# Patient Record
Sex: FEMALE | Race: WHITE | NOT HISPANIC OR LATINO | Employment: OTHER
[De-identification: names, ages, dates, MRNs, and addresses within clinical notes are randomized per-mention and may not be internally consistent; named-entity substitution may affect disease eponyms.]

---

## 2018-02-23 ENCOUNTER — RECORDS - HEALTHEAST (OUTPATIENT)
Dept: ADMINISTRATIVE | Facility: OTHER | Age: 23
End: 2018-02-23

## 2018-02-23 LAB — PAP SMEAR - HIM PATIENT REPORTED: NORMAL

## 2018-05-21 ENCOUNTER — OFFICE VISIT - HEALTHEAST (OUTPATIENT)
Dept: FAMILY MEDICINE | Facility: CLINIC | Age: 23
End: 2018-05-21

## 2018-05-21 DIAGNOSIS — Z00.00 HEALTH CARE MAINTENANCE: ICD-10-CM

## 2018-05-21 DIAGNOSIS — J45.909 ASTHMA: ICD-10-CM

## 2018-05-21 DIAGNOSIS — R10.2 PELVIC PAIN: ICD-10-CM

## 2018-05-21 LAB
ALBUMIN UR-MCNC: NEGATIVE MG/DL
APPEARANCE UR: CLEAR
BILIRUB UR QL STRIP: NEGATIVE
COLOR UR AUTO: YELLOW
GLUCOSE UR STRIP-MCNC: NEGATIVE MG/DL
HGB UR QL STRIP: NEGATIVE
KETONES UR STRIP-MCNC: NEGATIVE MG/DL
LEUKOCYTE ESTERASE UR QL STRIP: NEGATIVE
NITRATE UR QL: NEGATIVE
PH UR STRIP: 7 [PH] (ref 5–8)
SP GR UR STRIP: 1.01 (ref 1–1.03)
UROBILINOGEN UR STRIP-ACNC: NORMAL

## 2018-05-21 ASSESSMENT — MIFFLIN-ST. JEOR: SCORE: 1506.22

## 2018-05-23 LAB
C TRACH DNA SPEC QL PROBE+SIG AMP: NEGATIVE
N GONORRHOEA DNA SPEC QL NAA+PROBE: NEGATIVE

## 2018-05-29 ENCOUNTER — HOSPITAL ENCOUNTER (OUTPATIENT)
Dept: ULTRASOUND IMAGING | Facility: CLINIC | Age: 23
Discharge: HOME | End: 2018-05-29
Attending: NURSE PRACTITIONER

## 2018-05-29 DIAGNOSIS — R10.2 PELVIC PAIN: ICD-10-CM

## 2019-09-09 ENCOUNTER — COMMUNICATION - HEALTHEAST (OUTPATIENT)
Dept: SCHEDULING | Facility: CLINIC | Age: 24
End: 2019-09-09

## 2019-09-10 ENCOUNTER — RECORDS - HEALTHEAST (OUTPATIENT)
Dept: ADMINISTRATIVE | Facility: OTHER | Age: 24
End: 2019-09-10

## 2019-12-14 ENCOUNTER — RECORDS - HEALTHEAST (OUTPATIENT)
Dept: ADMINISTRATIVE | Facility: OTHER | Age: 24
End: 2019-12-14

## 2020-01-06 ENCOUNTER — RECORDS - HEALTHEAST (OUTPATIENT)
Dept: ADMINISTRATIVE | Facility: OTHER | Age: 25
End: 2020-01-06

## 2020-01-08 ENCOUNTER — COMMUNICATION - HEALTHEAST (OUTPATIENT)
Dept: FAMILY MEDICINE | Facility: CLINIC | Age: 25
End: 2020-01-08

## 2020-01-09 ENCOUNTER — COMMUNICATION - HEALTHEAST (OUTPATIENT)
Dept: SCHEDULING | Facility: CLINIC | Age: 25
End: 2020-01-09

## 2020-01-09 ENCOUNTER — RECORDS - HEALTHEAST (OUTPATIENT)
Dept: ADMINISTRATIVE | Facility: OTHER | Age: 25
End: 2020-01-09

## 2020-01-21 ENCOUNTER — RECORDS - HEALTHEAST (OUTPATIENT)
Dept: ADMINISTRATIVE | Facility: OTHER | Age: 25
End: 2020-01-21

## 2020-01-23 ENCOUNTER — OFFICE VISIT - HEALTHEAST (OUTPATIENT)
Dept: FAMILY MEDICINE | Facility: CLINIC | Age: 25
End: 2020-01-23

## 2020-01-23 DIAGNOSIS — Z00.00 ROUTINE GENERAL MEDICAL EXAMINATION AT A HEALTH CARE FACILITY: ICD-10-CM

## 2020-01-23 DIAGNOSIS — J45.909 ASTHMA: ICD-10-CM

## 2020-01-23 DIAGNOSIS — A38.9 SCARLET FEVER: ICD-10-CM

## 2020-01-23 DIAGNOSIS — J45.20 MILD INTERMITTENT ASTHMA WITHOUT COMPLICATION: ICD-10-CM

## 2020-01-23 RX ORDER — ALBUTEROL SULFATE 90 UG/1
2 AEROSOL, METERED RESPIRATORY (INHALATION) EVERY 4 HOURS PRN
Qty: 1 INHALER | Refills: 1 | Status: SHIPPED | OUTPATIENT
Start: 2020-01-23 | End: 2022-10-27

## 2020-01-23 ASSESSMENT — MIFFLIN-ST. JEOR: SCORE: 1437.95

## 2020-02-03 ENCOUNTER — COMMUNICATION - HEALTHEAST (OUTPATIENT)
Dept: SCHEDULING | Facility: CLINIC | Age: 25
End: 2020-02-03

## 2020-02-03 ENCOUNTER — RECORDS - HEALTHEAST (OUTPATIENT)
Dept: ADMINISTRATIVE | Facility: OTHER | Age: 25
End: 2020-02-03

## 2020-02-07 ENCOUNTER — OFFICE VISIT - HEALTHEAST (OUTPATIENT)
Dept: FAMILY MEDICINE | Facility: CLINIC | Age: 25
End: 2020-02-07

## 2020-02-07 DIAGNOSIS — Z86.73 HISTORY OF TIA (TRANSIENT ISCHEMIC ATTACK) AND STROKE: ICD-10-CM

## 2020-02-07 DIAGNOSIS — R07.89 ATYPICAL CHEST PAIN: ICD-10-CM

## 2020-02-07 DIAGNOSIS — R21 RASH: ICD-10-CM

## 2020-02-07 DIAGNOSIS — M25.562 ARTHRALGIA OF BOTH KNEES: ICD-10-CM

## 2020-02-07 DIAGNOSIS — M25.561 ARTHRALGIA OF BOTH KNEES: ICD-10-CM

## 2020-02-07 DIAGNOSIS — R53.83 FATIGUE, UNSPECIFIED TYPE: ICD-10-CM

## 2020-02-07 DIAGNOSIS — Z86.19 HISTORY OF SCARLET FEVER: ICD-10-CM

## 2020-02-07 LAB — RHEUMATOID FACT SERPL-ACNC: <15 IU/ML (ref 0–30)

## 2020-02-08 LAB — EBV VCA IGM SER IA-ACNC: <0.2 AI (ref 0–0.8)

## 2020-02-10 ENCOUNTER — COMMUNICATION - HEALTHEAST (OUTPATIENT)
Dept: FAMILY MEDICINE | Facility: CLINIC | Age: 25
End: 2020-02-10

## 2020-02-10 ENCOUNTER — HOSPITAL ENCOUNTER (OUTPATIENT)
Dept: CARDIOLOGY | Facility: CLINIC | Age: 25
Discharge: HOME | End: 2020-02-10
Attending: NURSE PRACTITIONER

## 2020-02-10 DIAGNOSIS — R07.89 ATYPICAL CHEST PAIN: ICD-10-CM

## 2020-02-10 LAB
ANA SER QL: 0.9 U
AORTIC ROOT: 2.7 CM
AORTIC VALVE MEAN VELOCITY: 84.8 CM/S
ASCENDING AORTA: 2.8 CM
AV DIMENSIONLESS INDEX VTI: 0.8
AV MEAN GRADIENT: 3 MMHG
AV PEAK GRADIENT: 5.1 MMHG
AV VALVE AREA: 2.4 CM2
AV VELOCITY RATIO: 0.9
BSA FOR ECHO PROCEDURE: 1.77 M2
CV BLOOD PRESSURE: NORMAL MMHG
CV ECHO HEIGHT: 64.3 IN
CV ECHO WEIGHT: 153 LBS
DOP CALC AO PEAK VEL: 113 CM/S
DOP CALC AO VTI: 24.2 CM
DOP CALC LVOT AREA: 2.83 CM2
DOP CALC LVOT DIAMETER: 1.9 CM
DOP CALC LVOT PEAK VEL: 96.8 CM/S
DOP CALC LVOT STROKE VOLUME: 57.2 CM3
DOP CALC MV VTI: 32.1 CM
DOP CALCLVOT PEAK VEL VTI: 20.2 CM
EJECTION FRACTION: 61 % (ref 55–75)
FRACTIONAL SHORTENING: 30.4 % (ref 28–44)
INTERVENTRICULAR SEPTUM IN END DIASTOLE: 0.8 CM (ref 0.6–0.9)
IVS/PW RATIO: 1
LA AREA 1: 19 CM2
LA AREA 2: 13.6 CM2
LEFT ATRIUM LENGTH: 4.28 CM
LEFT ATRIUM SIZE: 3.1 CM
LEFT ATRIUM VOLUME INDEX: 29 ML/M2
LEFT ATRIUM VOLUME: 51.3 ML
LEFT VENTRICLE CARDIAC INDEX: 2.1 L/MIN/M2
LEFT VENTRICLE CARDIAC OUTPUT: 3.7 L/MIN
LEFT VENTRICLE DIASTOLIC VOLUME INDEX: 42.4 CM3/M2 (ref 29–61)
LEFT VENTRICLE DIASTOLIC VOLUME: 75 CM3 (ref 46–106)
LEFT VENTRICLE HEART RATE: 65 BPM
LEFT VENTRICLE MASS INDEX: 66.6 G/M2
LEFT VENTRICLE SYSTOLIC VOLUME INDEX: 16.4 CM3/M2 (ref 8–24)
LEFT VENTRICLE SYSTOLIC VOLUME: 29 CM3 (ref 14–42)
LEFT VENTRICULAR INTERNAL DIMENSION IN DIASTOLE: 4.6 CM (ref 3.8–5.2)
LEFT VENTRICULAR INTERNAL DIMENSION IN SYSTOLE: 3.2 CM (ref 2.2–3.5)
LEFT VENTRICULAR MASS: 117.9 G
LEFT VENTRICULAR OUTFLOW TRACT MEAN GRADIENT: 2 MMHG
LEFT VENTRICULAR OUTFLOW TRACT MEAN VELOCITY: 71 CM/S
LEFT VENTRICULAR OUTFLOW TRACT PEAK GRADIENT: 4 MMHG
LEFT VENTRICULAR POSTERIOR WALL IN END DIASTOLE: 0.8 CM (ref 0.6–0.9)
LV STROKE VOLUME INDEX: 32.3 ML/M2
MITRAL VALVE DECELERATION SLOPE: 3400 MM/S2
MITRAL VALVE MEAN INFLOW VELOCITY: 54.4 CM/S
MITRAL VALVE PEAK VELOCITY: 114 CM/S
MITRAL VALVE PRESSURE HALF-TIME: 100 MS
MV AREA VTI: 1.78 CM2
MV AVERAGE E/E' RATIO: 5.2 CM/S
MV DECELERATION TIME: 282 MS
MV E'TISSUE VEL-LAT: 21.1 CM/S
MV E'TISSUE VEL-MED: 13.6 CM/S
MV LATERAL E/E' RATIO: 4.3
MV MEAN GRADIENT: 2 MMHG
MV MEDIAL E/E' RATIO: 6.7
MV PEAK E VELOCITY: 90.7 CM/S
MV PEAK GRADIENT: 5.2 MMHG
MV VALVE AREA BY CONTINUITY EQUATION: 1.8 CM2
MV VALVE AREA PRESSURE 1/2 METHOD: 2.2 CM2
NUC REST DIASTOLIC VOLUME INDEX: 2448 LBS
NUC REST SYSTOLIC VOLUME INDEX: 64.25 IN
TRICUSPID VALVE ANULAR PLANE SYSTOLIC EXCURSION: 1.7 CM

## 2020-02-10 ASSESSMENT — MIFFLIN-ST. JEOR: SCORE: 1427.97

## 2020-02-13 ENCOUNTER — OFFICE VISIT - HEALTHEAST (OUTPATIENT)
Dept: FAMILY MEDICINE | Facility: CLINIC | Age: 25
End: 2020-02-13

## 2020-02-13 ENCOUNTER — AMBULATORY - HEALTHEAST (OUTPATIENT)
Dept: LAB | Facility: CLINIC | Age: 25
End: 2020-02-13

## 2020-02-13 DIAGNOSIS — R63.4 WEIGHT LOSS: ICD-10-CM

## 2020-02-13 DIAGNOSIS — R19.7 DIARRHEA, UNSPECIFIED TYPE: ICD-10-CM

## 2020-02-13 DIAGNOSIS — Z86.19 HISTORY OF SCARLET FEVER: ICD-10-CM

## 2020-02-13 DIAGNOSIS — R59.1 LYMPHADENOPATHY: ICD-10-CM

## 2020-02-13 DIAGNOSIS — R10.84 ABDOMINAL PAIN, GENERALIZED: ICD-10-CM

## 2020-02-13 LAB
ALBUMIN SERPL-MCNC: 4.2 G/DL (ref 3.5–5)
ALBUMIN UR-MCNC: ABNORMAL MG/DL
ALP SERPL-CCNC: 49 U/L (ref 45–120)
ALT SERPL W P-5'-P-CCNC: 12 U/L (ref 0–45)
ANION GAP SERPL CALCULATED.3IONS-SCNC: 6 MMOL/L (ref 5–18)
APPEARANCE UR: CLEAR
AST SERPL W P-5'-P-CCNC: 15 U/L (ref 0–40)
BACTERIA #/AREA URNS HPF: ABNORMAL HPF
BILIRUB SERPL-MCNC: 0.6 MG/DL (ref 0–1)
BILIRUB UR QL STRIP: NEGATIVE
BUN SERPL-MCNC: 11 MG/DL (ref 8–22)
C DIFF TOX B STL QL: NEGATIVE
CALCIUM SERPL-MCNC: 9.2 MG/DL (ref 8.5–10.5)
CHLORIDE BLD-SCNC: 106 MMOL/L (ref 98–107)
CO2 SERPL-SCNC: 27 MMOL/L (ref 22–31)
COLOR UR AUTO: YELLOW
CREAT SERPL-MCNC: 0.9 MG/DL (ref 0.6–1.1)
GFR SERPL CREATININE-BSD FRML MDRD: >60 ML/MIN/1.73M2
GLUCOSE BLD-MCNC: 73 MG/DL (ref 70–125)
GLUCOSE UR STRIP-MCNC: NEGATIVE MG/DL
HBA1C MFR BLD: 5 % (ref 3.5–6)
HGB UR QL STRIP: NEGATIVE
KETONES UR STRIP-MCNC: NEGATIVE MG/DL
LEUKOCYTE ESTERASE UR QL STRIP: NEGATIVE
LIPASE SERPL-CCNC: 29 U/L (ref 0–52)
MUCOUS THREADS #/AREA URNS LPF: ABNORMAL LPF
NITRATE UR QL: NEGATIVE
PH UR STRIP: 5.5 [PH] (ref 4.5–8)
POTASSIUM BLD-SCNC: 4.5 MMOL/L (ref 3.5–5)
PROT SERPL-MCNC: 7 G/DL (ref 6–8)
RBC #/AREA URNS AUTO: ABNORMAL HPF
RIBOTYPE 027/NAP1/BI: NORMAL
SODIUM SERPL-SCNC: 139 MMOL/L (ref 136–145)
SP GR UR STRIP: 1.02 (ref 1–1.03)
SQUAMOUS #/AREA URNS AUTO: ABNORMAL LPF
T4 FREE SERPL-MCNC: 1 NG/DL (ref 0.7–1.8)
TSH SERPL DL<=0.005 MIU/L-ACNC: 0.99 UIU/ML (ref 0.3–5)
UROBILINOGEN UR STRIP-ACNC: ABNORMAL
WBC #/AREA URNS AUTO: ABNORMAL HPF

## 2020-02-14 ENCOUNTER — COMMUNICATION - HEALTHEAST (OUTPATIENT)
Dept: FAMILY MEDICINE | Facility: CLINIC | Age: 25
End: 2020-02-14

## 2020-02-14 LAB
BASOPHILS # BLD AUTO: 0.1 THOU/UL (ref 0–0.2)
BASOPHILS NFR BLD AUTO: 1 % (ref 0–2)
EOSINOPHIL # BLD AUTO: 0.2 THOU/UL (ref 0–0.4)
EOSINOPHIL NFR BLD AUTO: 3 % (ref 0–6)
ERYTHROCYTE [DISTWIDTH] IN BLOOD BY AUTOMATED COUNT: 13.3 % (ref 11–14.5)
HCT VFR BLD AUTO: 37.3 % (ref 35–47)
HGB BLD-MCNC: 12.3 G/DL (ref 12–16)
LAB AP CHARGES (HE HISTORICAL CONVERSION): NORMAL
LYMPHOCYTES # BLD AUTO: 2.4 THOU/UL (ref 0.8–4.4)
LYMPHOCYTES NFR BLD AUTO: 39 % (ref 20–40)
MCH RBC QN AUTO: 28.5 PG (ref 27–34)
MCHC RBC AUTO-ENTMCNC: 33 G/DL (ref 32–36)
MCV RBC AUTO: 87 FL (ref 80–100)
MONOCYTES # BLD AUTO: 0.5 THOU/UL (ref 0–0.9)
MONOCYTES NFR BLD AUTO: 8 % (ref 2–10)
NEUTROPHILS # BLD AUTO: 3 THOU/UL (ref 2–7.7)
NEUTROPHILS NFR BLD AUTO: 49 % (ref 50–70)
PATH REPORT.COMMENTS IMP SPEC: NORMAL
PATH REPORT.COMMENTS IMP SPEC: NORMAL
PATH REPORT.FINAL DX SPEC: NORMAL
PATH REPORT.MICROSCOPIC SPEC OTHER STN: ABNORMAL
PATH REPORT.RELEVANT HX SPEC: NORMAL
PLAT MORPH BLD: NORMAL
PLATELET # BLD AUTO: 195 THOU/UL (ref 140–440)
PMV BLD AUTO: 13.1 FL (ref 8.5–12.5)
RBC # BLD AUTO: 4.31 MILL/UL (ref 3.8–5.4)
REACTIVE LYMPHS: ABNORMAL
WBC: 6.1 THOU/UL (ref 4–11)

## 2020-05-11 ENCOUNTER — COMMUNICATION - HEALTHEAST (OUTPATIENT)
Dept: FAMILY MEDICINE | Facility: CLINIC | Age: 25
End: 2020-05-11

## 2020-05-13 ENCOUNTER — COMMUNICATION - HEALTHEAST (OUTPATIENT)
Dept: SCHEDULING | Facility: CLINIC | Age: 25
End: 2020-05-13

## 2020-05-14 ENCOUNTER — OFFICE VISIT - HEALTHEAST (OUTPATIENT)
Dept: FAMILY MEDICINE | Facility: CLINIC | Age: 25
End: 2020-05-14

## 2020-05-14 DIAGNOSIS — H93.13 TINNITUS, BILATERAL: ICD-10-CM

## 2020-05-14 DIAGNOSIS — R59.1 LYMPHADENOPATHY: ICD-10-CM

## 2020-05-14 DIAGNOSIS — N92.6 IRREGULAR MENSES: ICD-10-CM

## 2020-05-14 DIAGNOSIS — M25.50 POLYARTHRALGIA: ICD-10-CM

## 2020-05-14 DIAGNOSIS — R00.2 PALPITATIONS: ICD-10-CM

## 2020-05-14 DIAGNOSIS — R80.9 PROTEINURIA, UNSPECIFIED TYPE: ICD-10-CM

## 2020-05-19 ENCOUNTER — HOSPITAL ENCOUNTER (OUTPATIENT)
Dept: ULTRASOUND IMAGING | Facility: CLINIC | Age: 25
Discharge: HOME | End: 2020-05-19
Attending: NURSE PRACTITIONER

## 2020-05-19 DIAGNOSIS — R59.1 LYMPHADENOPATHY: ICD-10-CM

## 2020-05-20 ENCOUNTER — AMBULATORY - HEALTHEAST (OUTPATIENT)
Dept: LAB | Facility: CLINIC | Age: 25
End: 2020-05-20

## 2020-05-20 DIAGNOSIS — M25.50 POLYARTHRALGIA: ICD-10-CM

## 2020-05-20 DIAGNOSIS — H93.13 TINNITUS, BILATERAL: ICD-10-CM

## 2020-05-20 DIAGNOSIS — R59.1 LYMPHADENOPATHY: ICD-10-CM

## 2020-05-20 DIAGNOSIS — R00.2 PALPITATIONS: ICD-10-CM

## 2020-05-20 DIAGNOSIS — N92.6 IRREGULAR MENSES: ICD-10-CM

## 2020-05-20 DIAGNOSIS — R80.9 PROTEINURIA, UNSPECIFIED TYPE: ICD-10-CM

## 2020-05-20 LAB
ALBUMIN UR-MCNC: NEGATIVE MG/DL
ANION GAP SERPL CALCULATED.3IONS-SCNC: 7 MMOL/L (ref 5–18)
APPEARANCE UR: CLEAR
BILIRUB UR QL STRIP: NEGATIVE
BUN SERPL-MCNC: 10 MG/DL (ref 8–22)
CALCIUM SERPL-MCNC: 8.7 MG/DL (ref 8.5–10.5)
CHLORIDE BLD-SCNC: 105 MMOL/L (ref 98–107)
CO2 SERPL-SCNC: 27 MMOL/L (ref 22–31)
COLOR UR AUTO: YELLOW
CREAT SERPL-MCNC: 0.78 MG/DL (ref 0.6–1.1)
ERYTHROCYTE [DISTWIDTH] IN BLOOD BY AUTOMATED COUNT: 13.5 % (ref 11–14.5)
GFR SERPL CREATININE-BSD FRML MDRD: >60 ML/MIN/1.73M2
GLUCOSE BLD-MCNC: 83 MG/DL (ref 70–125)
GLUCOSE UR STRIP-MCNC: NEGATIVE MG/DL
HCT VFR BLD AUTO: 38.6 % (ref 35–47)
HGB BLD-MCNC: 13.5 G/DL (ref 12–16)
HGB UR QL STRIP: NEGATIVE
KETONES UR STRIP-MCNC: NEGATIVE MG/DL
LEUKOCYTE ESTERASE UR QL STRIP: NEGATIVE
MCH RBC QN AUTO: 29.6 PG (ref 27–34)
MCHC RBC AUTO-ENTMCNC: 35 G/DL (ref 32–36)
MCV RBC AUTO: 85 FL (ref 80–100)
NITRATE UR QL: NEGATIVE
PH UR STRIP: 7 [PH] (ref 5–8)
PLATELET # BLD AUTO: 150 THOU/UL (ref 140–440)
PMV BLD AUTO: 9.2 FL (ref 7–10)
POTASSIUM BLD-SCNC: 4.5 MMOL/L (ref 3.5–5)
PROLACTIN SERPL-MCNC: 19.4 NG/ML (ref 0–20)
RBC # BLD AUTO: 4.55 MILL/UL (ref 3.8–5.4)
SODIUM SERPL-SCNC: 139 MMOL/L (ref 136–145)
SP GR UR STRIP: 1.02 (ref 1–1.03)
TSH SERPL DL<=0.005 MIU/L-ACNC: 0.97 UIU/ML (ref 0.3–5)
UROBILINOGEN UR STRIP-ACNC: NORMAL
WBC: 5 THOU/UL (ref 4–11)

## 2020-05-22 ENCOUNTER — COMMUNICATION - HEALTHEAST (OUTPATIENT)
Dept: FAMILY MEDICINE | Facility: CLINIC | Age: 25
End: 2020-05-22

## 2020-05-22 LAB — B BURGDOR IGG+IGM SER QL: 0.06 INDEX VALUE

## 2020-08-25 ENCOUNTER — RECORDS - HEALTHEAST (OUTPATIENT)
Dept: HEALTH INFORMATION MANAGEMENT | Facility: CLINIC | Age: 25
End: 2020-08-25

## 2020-12-21 ENCOUNTER — COMMUNICATION - HEALTHEAST (OUTPATIENT)
Dept: FAMILY MEDICINE | Facility: CLINIC | Age: 25
End: 2020-12-21

## 2020-12-29 ENCOUNTER — OFFICE VISIT - HEALTHEAST (OUTPATIENT)
Dept: FAMILY MEDICINE | Facility: CLINIC | Age: 25
End: 2020-12-29

## 2020-12-29 DIAGNOSIS — I73.00 RAYNAUD'S DISEASE WITHOUT GANGRENE: ICD-10-CM

## 2020-12-29 DIAGNOSIS — Z86.73 HISTORY OF TIA (TRANSIENT ISCHEMIC ATTACK) AND STROKE: ICD-10-CM

## 2020-12-29 DIAGNOSIS — T14.8XXA BRUISING: ICD-10-CM

## 2020-12-29 DIAGNOSIS — M25.50 POLYARTHRALGIA: ICD-10-CM

## 2020-12-29 DIAGNOSIS — R10.2 PELVIC PAIN: ICD-10-CM

## 2020-12-29 LAB
APTT PPP: 34 SECONDS (ref 24–37)
C3 SERPL-MCNC: 118 MG/DL (ref 83–177)
C4 SERPL-MCNC: 25 MG/DL (ref 19–59)
ERYTHROCYTE [DISTWIDTH] IN BLOOD BY AUTOMATED COUNT: 12.2 % (ref 11–14.5)
HCT VFR BLD AUTO: 42.1 % (ref 35–47)
HGB BLD-MCNC: 14 G/DL (ref 12–16)
INR PPP: 0.97 (ref 0.9–1.1)
MCH RBC QN AUTO: 29.2 PG (ref 27–34)
MCHC RBC AUTO-ENTMCNC: 33.4 G/DL (ref 32–36)
MCV RBC AUTO: 87 FL (ref 80–100)
PLATELET # BLD AUTO: 201 THOU/UL (ref 140–440)
PMV BLD AUTO: 9.9 FL (ref 7–10)
RBC # BLD AUTO: 4.81 MILL/UL (ref 3.8–5.4)
WBC: 5.4 THOU/UL (ref 4–11)

## 2021-01-05 LAB
GLIADIN IGA SER-ACNC: 1.9 U/ML
GLIADIN IGG SER-ACNC: <0.4 U/ML
IGA SERPL-MCNC: 169 MG/DL (ref 65–400)
TTG IGA SER-ACNC: 0.4 U/ML
TTG IGG SER-ACNC: 2.5 U/ML

## 2021-01-20 ENCOUNTER — OFFICE VISIT - HEALTHEAST (OUTPATIENT)
Dept: RHEUMATOLOGY | Facility: CLINIC | Age: 26
End: 2021-01-20

## 2021-01-20 DIAGNOSIS — H93.13 TINNITUS OF BOTH EARS: ICD-10-CM

## 2021-01-20 DIAGNOSIS — M79.18 BUTTOCK PAIN: ICD-10-CM

## 2021-01-20 DIAGNOSIS — G89.29 CHRONIC PAIN OF MULTIPLE JOINTS: ICD-10-CM

## 2021-01-20 DIAGNOSIS — I73.00 RAYNAUD'S DISEASE WITHOUT GANGRENE: ICD-10-CM

## 2021-01-20 DIAGNOSIS — M25.50 CHRONIC PAIN OF MULTIPLE JOINTS: ICD-10-CM

## 2021-01-25 ENCOUNTER — COMMUNICATION - HEALTHEAST (OUTPATIENT)
Dept: RHEUMATOLOGY | Facility: CLINIC | Age: 26
End: 2021-01-25

## 2021-05-28 ASSESSMENT — ASTHMA QUESTIONNAIRES
ACT_TOTALSCORE: 23
ACT_TOTALSCORE: 22

## 2021-05-31 ENCOUNTER — RECORDS - HEALTHEAST (OUTPATIENT)
Dept: ADMINISTRATIVE | Facility: CLINIC | Age: 26
End: 2021-05-31

## 2021-06-01 VITALS — WEIGHT: 168.5 LBS | HEIGHT: 65 IN | BODY MASS INDEX: 28.07 KG/M2
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
## 2021-06-04 VITALS
OXYGEN SATURATION: 98 % | DIASTOLIC BLOOD PRESSURE: 52 MMHG | SYSTOLIC BLOOD PRESSURE: 82 MMHG | BODY MASS INDEX: 26.5 KG/M2 | HEIGHT: 64 IN | HEART RATE: 68 BPM | WEIGHT: 155.2 LBS

## 2021-06-04 VITALS
WEIGHT: 153.3 LBS | TEMPERATURE: 97.8 F | BODY MASS INDEX: 26.11 KG/M2 | DIASTOLIC BLOOD PRESSURE: 60 MMHG | OXYGEN SATURATION: 100 % | HEART RATE: 86 BPM | SYSTOLIC BLOOD PRESSURE: 98 MMHG

## 2021-06-04 VITALS
BODY MASS INDEX: 26.11 KG/M2 | WEIGHT: 153.3 LBS | SYSTOLIC BLOOD PRESSURE: 90 MMHG | OXYGEN SATURATION: 100 % | DIASTOLIC BLOOD PRESSURE: 58 MMHG | HEART RATE: 76 BPM

## 2021-06-04 VITALS — HEIGHT: 64 IN | WEIGHT: 153 LBS | BODY MASS INDEX: 26.12 KG/M2

## 2021-06-05 VITALS
DIASTOLIC BLOOD PRESSURE: 62 MMHG | OXYGEN SATURATION: 99 % | WEIGHT: 158.5 LBS | HEART RATE: 65 BPM | BODY MASS INDEX: 27 KG/M2 | SYSTOLIC BLOOD PRESSURE: 96 MMHG

## 2021-06-05 NOTE — TELEPHONE ENCOUNTER
"\"I am have some strep throat, scarlet fever complications.\" Patient completed Clindamycin last week. Reporting new onset of shortness of breath today. Chest pain with deep breath. Afebrile. Ongoing mild upper abdomen pain and tenderness.   Per guidelines advised Emergency Room. Patient stating she will go back to [facility] ER now and have fiance drive her.      Bell Josue RN  [facility]      Reason for Disposition    Difficulty breathing (per caller) but not severe    Protocols used: STREP THROAT INFECTION ON ANTIBIOTIC FOLLOW-UP CALL-A-AH      "

## 2021-06-05 NOTE — TELEPHONE ENCOUNTER
Symptom  Describe your symptoms: Sore throat , Cough out green mucous ,Temprature 100-101   Any pain: yes  New/Ongoing: Ongoing  How long have you been having symptoms: 4  day(s)  Have you been seen for this:  Yes Patient went Urgentcare on 01/06/2020  Appointment offered?: Patient declines  Triage offered?: Yes, declined  Home remedies tried: Patient is on Antibiotics .  Requested Pharmacy: [facility]  # [id number]  Okay to leave a detailed message? No  Patient states she went to Urgent care on 01/06/2020 they prescribed ZITHROMAX but its not helping her symptoms she is still coughing out green mucus fever T 100-101 when she is take Tylenol her tempeture is coming down . Patient states this coming Saturday is her wedding she is not felling good requesting provider recommendations. Please advise.

## 2021-06-05 NOTE — TELEPHONE ENCOUNTER
Rash on the neck only    Currently taking a z-toyin    It is itchy    It breathing ok    No nausea    No vomiting    Did have a strep rash on the neck when this all started.    Advised that she be seen.    Nisha Melton RN   [facility] Medication Refill and Triage Nurse  11:56 AM  1/9/2020      Reason for Disposition    Patient wants to be seen    Protocols used: RASH OR REDNESS - [id number]

## 2021-06-05 NOTE — PROGRESS NOTES
Assessment and Plan:    1. Routine general medical examination at a health care facility  Discussed consuming a healthy diet and exercising.  She declines influenza vaccine.  She is not interested in HIV or STD screening.    2. Scarlet fever  She continues clindamycin.  Symptoms are improving.    3. Asthma  Obtained ACT score of 22.  She continues albuterol as needed.  She is to follow-up if symptoms persist or worsen.  - albuterol (PROAIR HFA;PROVENTIL HFA;VENTOLIN HFA) 90 mcg/actuation inhaler; Inhale 2 puffs every 4 (four) hours as needed.  Dispense: 1 Inhaler; Refill: 1      Subjective:     Otf is a 24 y.o. female presenting to the clinic for a female physical.     LMP: 1/20/20 regular once/month   Hx of abnormal pap smear: none   Last pap smear: last year at [facility]   Perform self-breast exams: yes   Vaginal discharge or irritation: none   Sexually active:  January 11th   Contraception: none   Concerns for STDs: none   Previous pregnancies:none     She presents for ER follow-up. She has intermittent asthma which flares during the spring and summer.  She used her albuterol inhaler twice over the past week due to recent strep pharyngitis.  Patient was diagnosed on 1/6/2020 with scarlet fever.  She was treated with a Z-Aravind.  Patient's presented to the ER on 1/9/2020 because her rash was worsening.  She was started on clindamycin.  She was then seen in the emergency room on 1/01/19 due to dizziness.  This was suspected to be due to dehydration.  Patient has increased her fluid intake.  Patient states her sore throat has improved.  She has a small rash within her left upper abdomen, otherwise the rash has resolved.    Review of systems:  I performed a 10 point review of systems.  All pertinent positives and negatives are noted in the HPI. All others are negative.     Allergies   Allergen Reactions     Amoxicillin Rash     Sulfa (Sulfonamide Antibiotics) Hives and Rash      Sulfamethoxazole-Trimethoprim Rash       Current Outpatient Medications on File Prior to Visit   Medication Sig Dispense Refill     albuterol (PROAIR HFA;PROVENTIL HFA;VENTOLIN HFA) 90 mcg/actuation inhaler Inhale 2 puffs every 4 (four) hours as needed. 1 Inhaler 1     clindamycin (CLEOCIN) 300 MG capsule        cyanocobalamin (VITAMIN B-12) 1000 MCG tablet Take 1,000 mcg by mouth daily.       folic acid (FOLVITE) 1 MG tablet Take 1 mg by mouth daily.       aspirin 81 mg chewable tablet Chew 81 mg daily.       No current facility-administered medications on file prior to visit.        Social History     Socioeconomic History     Marital status: Single     Spouse name: Not on file     Number of children: 0     Years of education: Not on file     Highest education level: Not on file   Occupational History     Occupation:    Social Needs     Financial resource strain: Not on file     Food insecurity:     Worry: Not on file     Inability: Not on file     Transportation needs:     Medical: Not on file     Non-medical: Not on file   Tobacco Use     Smoking status: Never Smoker     Smokeless tobacco: Never Used   Substance and Sexual Activity     Alcohol use: Yes     Alcohol/week: 5.0 standard drinks     Types: 5 Cans of beer per week     Drug use: No     Sexual activity: Never   Lifestyle     Physical activity:     Days per week: Not on file     Minutes per session: Not on file     Stress: Not on file   Relationships     Social connections:     Talks on phone: Not on file     Gets together: Not on file     Attends Religion service: Not on file     Active member of club or organization: Not on file     Attends meetings of clubs or organizations: Not on file     Relationship status: Not on file     Intimate partner violence:     Fear of current or ex partner: Not on file     Emotionally abused: Not on file     Physically abused: Not on file     Forced sexual activity: Not on file   Other Topics Concern     Not  on file   Social History Narrative     Not on file       Past Medical History:   Diagnosis Date     Asthma      Clotting disorder (H)      Peptic ulceration        Family History   Problem Relation Age of Onset     Cancer Paternal Grandfather 74        mesothelioma       No past surgical history on file.    Objective:     Vitals:    01/23/20 1020   BP: (!) 82/52   Pulse: 68   SpO2: 98%       Patient is alert, no obvious distress.   Skin: Warm, dry. She has a pink sandpaper appearing rash within her left upper abdomen.    HEENT:  Eyes normal.  Ears normal.  Nose patent, mucosa pink.  Oropharynx mucosa pink, no lesions or tonsil enlargement.   Neck:  Supple, without lymphadenopathy, bruits, JVD. Thyroid normal texture and size.    Lungs:  Clear to auscultation.  No wheezing, rales noted.  Respirations even and unlabored.   Heart:  Regular rate and rhythm.  No murmurs.   Breasts:  Normal.  No surrounding adenopathy.   Abdomen: Soft, nontender.  No organomegaly.  Bowel sounds normoactive.  No guarding or masses noted.   : deferred  Musculoskeletal:  Full ROM of extremities.  Muscle strength equal +5/5.   Neurological:  Cranial nerves 2-12 intact.

## 2021-06-05 NOTE — TELEPHONE ENCOUNTER
Left message to call back for: symptom/ medication question  Information to relay to patient:  Below message. Patient should notice improvement in symptoms within 48-72 hours after starting the azithromycin. Azithromycin will continue to work for 5 days after her last dose.    Continue symptomatic measures.

## 2021-06-05 NOTE — TELEPHONE ENCOUNTER
Please see below message  When can patient expect improvement with symptoms while taking azithromycin?

## 2021-06-06 NOTE — PROGRESS NOTES
Assessment and Plan:     1. Diarrhea, unspecified type  C. difficile Toxigenic by PCR   2. Abdominal pain, generalized  Lipase    Urinalysis-UC if Indicated   3. Weight loss  Glycosylated Hemoglobin A1c    Thyroid Stimulating Hormone (TSH)    T4, Free    Comprehensive Metabolic Panel    Morphology,Smear Review (MORP)   4. Lymphadenopathy     5. History of scarlet fever       Due to treatment with a Z-Aravind and clindamycin, there are concerns regarding C. difficile.  Patient has been experiencing abdominal discomfort and diarrhea.  We will also check CMP, lipase, and urinalysis.  Patient is concerned of ongoing weight loss.  She does admit, though, that she has been eating healthier.  Will rule out diabetes, thyroid disease.  The previous scarlet fever infection likely contributed.  Due to persistent lymph node, will check peripheral blood smear.  Enlarged lymph node is likely reactive due to history of scarlet fever.  I encouraged her to increase her fluid intake, caloric intake, and increase her activity level as she may be somewhat deconditioned.  She is to follow-up if symptoms persist or worsen.    Subjective:     Otf is a 24 y.o. female presenting to the clinic for concerns for ongoing fatigue.  Patient was diagnosed on 1/6/2020 with scarlet fever.  She was treated initially with a Z-Aravind.  She presented to the ER on 1/9/2020 because her rash was worsening.  She was then started on clindamycin.  Patient then followed up in the emergency room on 1/10/2020 due to dizziness.  This was suspected to be from dehydration.  Patient then presented to the ER on 2/3/2020 with concerns of atypical chest pain and dyspnea.  She had a normal EKG and troponin suggesting no myocarditis.  She had a chest x-ray showing no CHF, pneumonia or pneumothorax.  D-dimer was mildly elevated so CT scan was performed showing no PE.  Patient presented to the clinic on 2/7/2020 with concerns of worsening chest pain and shortness of breath.   Echocardiogram was performed and was unremarkable.  Patient presents today concerned of an enlarged lymph node within her left posterior neck.  She does have a tooth fracture and needs to have 2 teeth pulled.  This is tentatively scheduled for March 11.  Patient has had softer stools since taking the clindamycin.  Over the past 2 days, she has had upper abdominal pain which is worse with movement.  She did have diarrhea today.  Patient has noticed an increase in flatulence and nausea.  Patient states her chest pain has improved.  She is not short of breath.  She admits she has been eating less due to the abdominal discomfort.  She denies blood or mucus in the stool.  She is having more frequent bowel movements throughout the day.  She states they are softer in consistency.  She is traveling to [place] tomorrow for her tw[place] and will be there for 10 days.     Review of Systems: A complete 14 point review of systems was obtained and is negative or as stated in the history of present illness.    Social History     Socioeconomic History     Marital status: Single     Spouse name: Not on file     Number of children: 0     Years of education: Not on file     Highest education level: Not on file   Occupational History     Occupation:    Social Needs     Financial resource strain: Not on file     Food insecurity:     Worry: Not on file     Inability: Not on file     Transportation needs:     Medical: Not on file     Non-medical: Not on file   Tobacco Use     Smoking status: Never Smoker     Smokeless tobacco: Never Used   Substance and Sexual Activity     Alcohol use: Yes     Alcohol/week: 2.0 standard drinks     Types: 2 Cans of beer per week     Drug use: No     Sexual activity: Never   Lifestyle     Physical activity:     Days per week: Not on file     Minutes per session: Not on file     Stress: Not on file   Relationships     Social connections:     Talks on phone: Not on file     Gets together: Not on  file     Attends Voodoo service: Not on file     Active member of club or organization: Not on file     Attends meetings of clubs or organizations: Not on file     Relationship status: Not on file     Intimate partner violence:     Fear of current or ex partner: Not on file     Emotionally abused: Not on file     Physically abused: Not on file     Forced sexual activity: Not on file   Other Topics Concern     Not on file   Social History Narrative     Not on file       Active Ambulatory Problems     Diagnosis Date Noted     Plantar Warts      Allergic Rhinitis      Asthma      History of scarlet fever 02/07/2020     History of TIA (transient ischemic attack) and stroke 02/07/2020     Resolved Ambulatory Problems     Diagnosis Date Noted     Acute pharyngitis      Past Medical History:   Diagnosis Date     Clotting disorder (H)      Peptic ulceration        Family History   Problem Relation Age of Onset     Cancer Paternal Grandfather 74        mesothelioma     No Medical Problems Mother      No Medical Problems Father        Objective:     BP 90/58 (Patient Site: Right Arm, Cuff Size: Adult Regular)   Pulse 76   Wt 153 lb 4.8 oz (69.5 kg)   LMP 01/20/2020   SpO2 100%   BMI 26.11 kg/m      Patient is alert, in no obvious distress.   Skin: Warm, dry.  No lesions or rashes.  Skin turgor rapid return.   HEENT:  Head normocephalic, atraumatic.  Eyes normal. Ears normal.  Nose patent, mucosa pink.  Oropharynx mucosa pink.  No lesions or tonsillar enlargement.   Neck: Supple, she has a small 1/2 cm left posterior cervical lymph node.  No thyromegaly.  Lungs:  Clear to auscultation. Respirations even and unlabored.  No wheezing or rales noted.   Heart:  Regular rate and rhythm.  No murmurs, S3, S4, gallops, or rubs.    Abdomen: Soft, nontender.  No organomegaly. Bowel sounds normoactive. No guarding or masses noted.

## 2021-06-08 NOTE — TELEPHONE ENCOUNTER
Left message to call back for: pt  Information to relay to patient:  Left message to call and schedule virtual visit with Emerald Newman. Please help schedule when patient calls back.

## 2021-06-08 NOTE — PROGRESS NOTES
"Otf Beck is a 24 y.o. female who is being evaluated via a billable video visit.      The patient has been notified of following:     \"This video visit will be conducted via a call between you and your physician/provider. We have found that certain health care needs can be provided without the need for an in-person physical exam.  This service lets us provide the care you need with a video conversation.  If a prescription is necessary we can send it directly to your pharmacy.  If lab work is needed we can place an order for that and you can then stop by our lab to have the test done at a later time.    Video visits are billed at different rates depending on your insurance coverage. Please reach out to your insurance provider with any questions.    If during the course of the call the physician/provider feels a video visit is not appropriate, you will not be charged for this service.\"    Patient has given verbal consent to a Video visit? Yes    Patient would like to receive their AVS by AVS Preference: Madelaine.    Patient would like the video invitation sent by: Send to e-mail at: [email or web address]    Will anyone else be joining your video visit? No        Video Start Time: 11:40 AM     Additional provider notes:  Assessment and Plan:     1. Palpitations  We will rule out electrolyte imbalance and thyroid disease.  Will obtain Holter monitor for further evaluation.  Discussed avoidance of caffeine.  Anxiety may be contributing.  - Basic Metabolic Panel; Future  - Thyroid Cascade; Future  - Holter Monitor; Future    2. Lymphadenopathy  We will check hemogram.  Will obtain ultrasound for further evaluation.  - HM2(CBC w/o Differential); Future  - US Neck Limited; Future    3. Polyarthralgia  Patient has had normal autoimmune labs in the past including REGI and RA.  Will obtain Lyme's cascade.  Will refer to rheumatology.  - Lyme Antibody [place]  - Ambulatory referral to Rheumatology    4. Tinnitus, " bilateral  Patient has had new onset bilateral tinnitus.  Discussed possible OME.  Allergies may be contributing.  Recommend starting an over-the-counter antihistamine.  If no improved symptoms, may consider referral to ENT.    5. Proteinuria, unspecified type  Patient has a history of proteinuria.  Will obtain urinalysis.    - Urinalysis-UC if Indicated; Future    6.  Irregular menses  Patient has had some weight loss since her diagnosis of scarlet fever.  This and stress may be contributing.  Will check prolactin level and thyroid cascade and notify patient of results.  She is content with the plan.      Subjective:     Otf is a 24 y.o. female presenting for a video visit.  Patient was diagnosed on 1/6/2020 with fever and was initially treated with a Z-Aravind.  Patient continued to have a plethora of symptoms and ER visits which included chest pain, dyspnea lymphadenopathy, diarrhea.  Patient had a normal EKG and troponin.  She had a chest x-ray showing no CHF, pneumonia, or pneumothorax.  Chest CT showed no PE.  Patient states her symptoms did improve when she felt normal for 1 month.  Over the past weekend, she developed palpitations which occur primarily at night.  They will last for 20 minutes.  This causes her to feel short of breath, so she uses her albuterol inhaler.  She does feel some anxiety at this time.  She denies chest pain, chest tightness.  She denies recent travel.  She did develop a rash on her trunk 2 nights ago, but it subsided by morning.  She applied moisturizing lotion.  Over the weekend, she also developed tinnitus which has been constant.  She has had some mild sinus congestion and ear fullness.  Patient has found that she is more diaphoretic.  She has intermittent joint pain within her elbows, knees, and toes.  Occasionally, her knees appear red in color.  She denies any recent tick bites, but [place]  She continues to have a lymph node within the left side  of her neck which fluctuates in size.  Patient states occasionally it is as large as a marble.  This has been uncomfortable for her.  She denies recent Sudafed use and has cut out caffeine.  Patient states her last 2 menstrual periods came 10 days early.    Review of Systems: A complete 14 point review of systems was obtained and is negative or as stated in the history of present illness.    Social History     Socioeconomic History     Marital status: Single     Spouse name: Not on file     Number of children: 0     Years of education: Not on file     Highest education level: Not on file   Occupational History     Occupation:    Social Needs     Financial resource strain: Not on file     Food insecurity     Worry: Not on file     Inability: Not on file     Transportation needs     Medical: Not on file     Non-medical: Not on file   Tobacco Use     Smoking status: Never Smoker     Smokeless tobacco: Never Used   Substance and Sexual Activity     Alcohol use: Yes     Alcohol/week: 2.0 standard drinks     Types: 2 Cans of beer per week     Drug use: No     Sexual activity: Never   Lifestyle     Physical activity     Days per week: Not on file     Minutes per session: Not on file     Stress: Not on file   Relationships     Social connections     Talks on phone: Not on file     Gets together: Not on file     Attends Nondenominational service: Not on file     Active member of club or organization: Not on file     Attends meetings of clubs or organizations: Not on file     Relationship status: Not on file     Intimate partner violence     Fear of current or ex partner: Not on file     Emotionally abused: Not on file     Physically abused: Not on file     Forced sexual activity: Not on file   Other Topics Concern     Not on file   Social History Narrative     Not on file       Active Ambulatory Problems     Diagnosis Date Noted     Plantar Warts      Allergic Rhinitis      Asthma      History of scarlet fever 02/07/2020      History of TIA (transient ischemic attack) and stroke 02/07/2020     Resolved Ambulatory Problems     Diagnosis Date Noted     Acute pharyngitis      Past Medical History:   Diagnosis Date     Clotting disorder (H)      Peptic ulceration        Family History   Problem Relation Age of Onset     Cancer Paternal Grandfather 74        mesothelioma     No Medical Problems Mother      No Medical Problems Father        Objective:     There were no vitals taken for this visit.     GENERAL: Healthy, alert and no distress  EYES: Eyes grossly normal to inspection. No discharge or erythema, or obvious scleral/conjunctival abnormalities.  HENT: Normal cephalic/atraumatic.  External ears, nose and mouth without ulcers or lesions. No nasal drainage visible.  NECK: No asymmetry, masses or scars  RESP: No audible wheeze, cough, or visible cyanosis.  No visible retractions or increased work of breathing.    MS: No gross musculoskeletal defects noted.  Normal range of motion. No visible edema.  SKIN: Visible skin clear. No significant rash, abnormal pigmentation or lesions.  NEURO: Cranial nerves grossly intact. Mentation and speech appropriate for age.  PSYCH: Mentation appears normal, affect normal/bright, judgement and insight intact, normal speech and appearance well-groomed      Video-Visit Details    Type of service:  Video Visit    Video End Time (time video stopped): 11:59 AM   Originating Location (pt. Location): Home    Distant Location (provider location):  [facility]     Platform used for Video Visit: [employer]  We tried to use [employer], but it did not work.      Emerald Newman, CNP

## 2021-06-11 ENCOUNTER — COMMUNICATION - HEALTHEAST (OUTPATIENT)
Dept: SCHEDULING | Facility: CLINIC | Age: 26
End: 2021-06-11

## 2021-06-14 ENCOUNTER — OFFICE VISIT - HEALTHEAST (OUTPATIENT)
Dept: FAMILY MEDICINE | Facility: CLINIC | Age: 26
End: 2021-06-14

## 2021-06-14 DIAGNOSIS — M25.50 CHRONIC PAIN OF MULTIPLE JOINTS: ICD-10-CM

## 2021-06-14 DIAGNOSIS — R25.3 MUSCLE FASCICULATION: ICD-10-CM

## 2021-06-14 DIAGNOSIS — I73.00 RAYNAUD'S DISEASE WITHOUT GANGRENE: ICD-10-CM

## 2021-06-14 DIAGNOSIS — M79.18 BUTTOCK PAIN: ICD-10-CM

## 2021-06-14 DIAGNOSIS — R53.83 FATIGUE, UNSPECIFIED TYPE: ICD-10-CM

## 2021-06-14 DIAGNOSIS — G89.29 CHRONIC PAIN OF MULTIPLE JOINTS: ICD-10-CM

## 2021-06-14 DIAGNOSIS — H93.13 TINNITUS OF BOTH EARS: ICD-10-CM

## 2021-06-14 DIAGNOSIS — R27.0 ATAXIA: ICD-10-CM

## 2021-06-14 LAB
ALBUMIN SERPL-MCNC: 4.3 G/DL (ref 3.5–5)
ALT SERPL W P-5'-P-CCNC: 12 U/L (ref 0–45)
AST SERPL W P-5'-P-CCNC: 14 U/L (ref 0–40)
CALCIUM SERPL-MCNC: 8.8 MG/DL (ref 8.5–10.5)
CK SERPL-CCNC: 68 U/L (ref 30–190)
CREAT SERPL-MCNC: 0.79 MG/DL (ref 0.6–1.1)
ERYTHROCYTE [DISTWIDTH] IN BLOOD BY AUTOMATED COUNT: 12.9 % (ref 11–14.5)
ERYTHROCYTE [SEDIMENTATION RATE] IN BLOOD BY WESTERGREN METHOD: 6 MM/HR (ref 0–20)
GFR SERPL CREATININE-BSD FRML MDRD: >60 ML/MIN/1.73M2
HCT VFR BLD AUTO: 34.9 % (ref 35–47)
HGB BLD-MCNC: 12 G/DL (ref 12–16)
MAGNESIUM SERPL-MCNC: 2 MG/DL (ref 1.8–2.6)
MCH RBC QN AUTO: 29.8 PG (ref 27–34)
MCHC RBC AUTO-ENTMCNC: 34.4 G/DL (ref 32–36)
MCV RBC AUTO: 87 FL (ref 80–100)
PLATELET # BLD AUTO: 169 THOU/UL (ref 140–440)
PMV BLD AUTO: 11.6 FL (ref 7–10)
RBC # BLD AUTO: 4.03 MILL/UL (ref 3.8–5.4)
TSH SERPL DL<=0.005 MIU/L-ACNC: 0.55 UIU/ML (ref 0.3–5)
URATE SERPL-MCNC: 4.6 MG/DL (ref 2–7.5)
VIT B12 SERPL-MCNC: 1049 PG/ML (ref 213–816)
WBC: 5.3 THOU/UL (ref 4–11)

## 2021-06-14 NOTE — PROGRESS NOTES
Otf Beck who presents today with a chief complaint of  No chief complaint on file.      Joint Pains: Yes  Location: knees  Onset: a year  Intensity: 3-4 /10  AM Stiffness: Minutes  Alleviating/Aggravating Factors: knee been red especially after being active.   Tolerating Meds: Yes  Other: There is Raynaud's with reocclusion associated with stress glucose try to get up for lunch and does not want to always practice because of MRSA remote virtual trip to suggest a model Moe      ROS:  Patient denies having: persistent dry eyes, dry mouth, recurrent oral ulcers, patchy alopecia, active rashes, photosensitivity, history of psoriasis, active chest pain, active shortness of breath, active cough, active dysuria, history of kidney stones, active abdominal pain + (on and off for a year), active diarrhea, history of hematochezia, active dysphagia, history of peptic ulcer disease, history of HIV, tuberculosis, hepatitis B or C, Lyme disease, seizure history, raynaud's, active documented fevers + (slightly at night, 99.8), recent infections, difficulty sleeping + (ocassionally) or chronic unrefreshing sleep, involuntary weight loss, loss of appetite, excessive fatigue, depression, anxiety,  recurrent sinus infections, history of inflammatory eye diseases (such as uveitis, scleritis, iritis, etc).     Information gathered by medical assistant incorporated into this note, was reviewed and discussed with the patient.    Problem List:  Patient Active Problem List   Diagnosis     Plantar Warts     Allergic Rhinitis     Asthma     History of scarlet fever     History of TIA (transient ischemic attack) and stroke        PMH:   Past Medical History:   Diagnosis Date     Asthma      Clotting disorder (H)      Peptic ulceration        Surgical History:  No past surgical history on file.    Family History:  Family History   Problem Relation Age of Onset     Cancer Paternal Grandfather 74        mesothelioma     No Medical  Problems Mother      No Medical Problems Father        Social History:   reports that she has never smoked. She has never used smokeless tobacco. She reports current alcohol use of about 2.0 standard drinks of alcohol per week. She reports that she does not use drugs.    Allergies:  Allergies   Allergen Reactions     Amoxicillin Rash     Sulfa (Sulfonamide Antibiotics) Hives and Rash     Sulfamethoxazole-Trimethoprim Rash        Current Medications:  Current Outpatient Medications   Medication Sig Dispense Refill     albuterol (PROAIR HFA;PROVENTIL HFA;VENTOLIN HFA) 90 mcg/actuation inhaler Inhale 2 puffs every 4 (four) hours as needed. 1 Inhaler 1     aspirin 81 mg chewable tablet Chew 81 mg daily.       cyanocobalamin (VITAMIN B-12) 1000 MCG tablet Take 1,000 mcg by mouth daily.       folic acid (FOLVITE) 1 MG tablet Take 1 mg by mouth daily.       No current facility-administered medications for this visit.            Physical Exam:  Following up today via video visit, per Covid-19 pandemic requirements.    Verbal consent has been obtained for this service by care team member.    Video call start time: 1:17 PM    Video call end time: 1:53 PM    Dox[employer] utilized for video call.    Phone number utilized: [[phone]]    Patient location for video visit: Home     Provider location for video visit:  Home (working remotely)        Summary/Assessment:    Pleasant 25-year-old female presents with complaints of mild multiple joint pains and Raynaud's-like symptoms.    Patient explains she was fine up until the beginning of 2020 just around when she got  she developed sore throat followed by rash, eventually diagnosed with scarlet fever.  Around this time.  She also began experiencing some joint pains which within a few weeks became more noticeable.    Joints involved: Knees bilaterally (mainly), at times ankles, at times elbows, at times, low back near upper buttock regions.    Denies having any joint swelling  however has noticed joints become erythematous when active/flaring.      Usually her symptoms are self-limiting.  Sometimes she will take ibuprofen 400 mg which provides relief.    Has not tried taking Tylenol.    Takes a baby aspirin daily due to a TIA sustained at 13 years old.  Also takes B12 and folic acid for this purpose.    Patient is very active as a figure skating .  Sometimes has some ankle pains while skating, has not noticed knee pains.  Typically her pains are more present towards the end of the day, after working hours.    Also has noticed some easy bruising, noted to have negative cardiolipin antibodies, lupus anticoagulant and beta-2 glycoprotein.    Patient also complains of noticing her fingers and toes winnie with cold weather exposure also noticed lately when on ice-skating rink for a while.    States has an uncle with rheumatoid arthritis.  Patient had rheumatoid factor that was negative,, REGI, ESR and CRP levels were also negative.    Only lately has had some difficulty sleeping, typically sleeps well.    Denies any history of anxiety or depression.  States he is usually easygoing and meditates.    Has occasional myalgias, when present usually around her hips.    Has also noticed some very mild tinnitus, also onset since beginning of 2020.  Only notices it when in a quiet room.  Has not seen ENT.  Saw her dentist, thought perhaps related to TMJ (has a component of).  Has also tried treating for allergies, with no improvement.    States she is 5 foot 5 and weighs 159 pounds.    Currently not taking any vitamin D.    It is difficult to tell at this time as to what is the primary etiology contributing to patient's symptoms described above.  Will obtain some additional autoimmune/inflammatory markers and correlate clinically.    Please see below for management plan.        Pertinent rheumatology/past medical history (please refer to above for more detailed history):      Chronic episodic  multiple joint pains, mild (knees mainly, ankles, elbows)    Chronic episodic upper buttock pain, mild    Raynaud's    Tinnitus, mild (bilateral)    Easy bruising    History of strep infection followed by scarlet fever (1/2020)    History of TIA (13 years old)    Asthma      Rheumatology medications provided/suggested:    Tylenol      Pertinent medication from other providers or from otc (please refer to above for more detailed med list):    Ibuprofen  Aspirin  B12  Folic acid    Pertinent medications already tried:     Sulfa allergy      Pertinent lab history:    Negative/unremarkable: Rheumatoid factor, REGI, complements, cardiolipin antibodies, lupus anticoagulant, beta-2 glycoprotein, CBC, Lyme antibody, TSH, creatinine      Pertinent imaging/test history:          Other:    Marital status:       How many kids:  no    Type of work:  yes, 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  Has a masters in psychology, considering pursuing PhD.    Drinking alcohol: yes, 2-3 times a week    Tobacco use: no     Recreational drug use: no    Active contraceptive: no     History hysterectomy: no     Tubal ligation: no     Partner vasectomy: no       Plan:      For arthralgias/myalgias, suggest paient take over-the-counter Tylenol 500-1000 mg twice daily as needed for pain relief.    If insufficient relief with Tylenol suggest trying ibuprofen 400-800 mg 3 times daily as needed, to be taken with food.     Deferred adding a muscle relaxer    Regarding Raynaud's-like symptoms, conservative measures discussed with the patient.    Will obtain x-rays of: Knees, ankles and SI joints.    We will obtain some labs and correlate clinically.    Follow-up in 2 months.    Procedure note:       Major side effect profile of medications provided/suggested were discussed with the patient.    This note was transcribed using Dragon voice recognition software as a result unintentional grammatical errors or word substitutions may have occurred. Please  contact our Rheumatology department if you need any clarification or if you have any related inquiries.    Thank you for referring this patient to our clinic.    Sammy Esparza DO     ....................  1/20/2021   12:31 PM

## 2021-06-14 NOTE — PATIENT INSTRUCTIONS - HE
Summary of Your Rheumatology Visit    Next Appointment:  2 Months        Medications:    Recommend trying Tylenol 500-1000 mg twice a day if necessary for pain relief.    If insufficient relief with Tylenol suggest trying ibuprofen 400-800 mg 2-3 times daily as needed, to be taken with food.    Referrals:      Tests:     Please have labs and x-rays that were ordered performed.     Given that labs include cryoglobulin level, recommend having performed at hospital setting, patient prefers to go to [facility]    Injections:        Other:

## 2021-06-14 NOTE — TELEPHONE ENCOUNTER
lvmtcb      Please give pt a call to schedule for her next appt and lab. Thank you      Next Appointment:  2 Months       Tests:      Please have labs and x-rays that were ordered performed.     Given that labs include cryoglobulin level, recommend having performed at hospital setting, patient prefers to go to [facility]

## 2021-06-14 NOTE — PROGRESS NOTES
Assessment and Plan:     1. Polyarthralgia  Celiac(Gluten)Antibody Panel ($$$)    Ambulatory referral to Rheumatology    Complement, C'4    Complement, C'3   2. Bruising  INR    APTT(PTT)    HM2(CBC w/o Differential)   3. Raynaud's disease without gangrene  Ambulatory referral to Rheumatology    Complement, C'4    Complement, C'3   4. Pelvic pain  Ambulatory referral to Obstetrics / Gynecology   5. History of TIA (transient ischemic attack) and stroke       Patient continues to experience polyarthralgia, Raynaud's, and bruising.  Suspect bruising is related to recent initiation of aspirin.  Due to symptoms, will rule out celiac.  She has had a normal REGI and RA in the past.  We will check complement C4 and C3.  Will refer to rheumatology for further evaluation.  Due to Raynaud's and symptoms worsening with cold exposure, may consider evaluating cryoglobulins in the future.  Due to ongoing pelvic pain, will refer to OB/GYN for further evaluation as she is concerned of underlying endometriosis.  She is content with the plan.    Subjective:     Otf is a 25 y.o. female presenting to the clinic for multiple concerns today.  Patient was diagnosed with scarlet fever in February.  For multiple months, she experienced lymphadenopathy, rashes, joint pain, fatigue.  She had an ultrasound of her neck showing no concerning enlarged lymph nodes.  A plethora of labs showed no underlying EBV, Lyme's, autoimmune disease.  Patient states her symptoms improved during the summer.  Over the past month, she has noted some extensive bruising on her legs.  The bruises do typically develop after she bumps an extremity against an object.  She has also recently started taking aspirin regularly.  She has a history of a TIA diagnosed at age of 13.  Initially, the TIA was thought to be related to antiphospholipid syndrome, but she states she saw a specialist who disputed this.  Patient continues to experience intermittent erythema and  swelling of her knees.  She also experiences Raynaud's when exposed to the cold.  She has a history of chronic pelvic pain which occurs around her menstrual period.  Patient would like further evaluation of underlying endometriosis.  She denies constipation, but states she does experience diarrhea around her menstrual periods.  Her last menstrual period was on [place] and lasted 4 to 5 days.  Her periods are regular, occurring once per month.    Review of Systems: A complete 14 point review of systems was obtained and is negative or as stated in the history of present illness.    Social History     Socioeconomic History     Marital status: Single     Spouse name: Not on file     Number of children: 0     Years of education: Not on file     Highest education level: Not on file   Occupational History     Occupation:    Social Needs     Financial resource strain: Not on file     Food insecurity     Worry: Not on file     Inability: Not on file     Transportation needs     Medical: Not on file     Non-medical: Not on file   Tobacco Use     Smoking status: Never Smoker     Smokeless tobacco: Never Used   Substance and Sexual Activity     Alcohol use: Yes     Alcohol/week: 2.0 standard drinks     Types: 2 Cans of beer per week     Drug use: No     Sexual activity: Never   Lifestyle     Physical activity     Days per week: Not on file     Minutes per session: Not on file     Stress: Not on file   Relationships     Social connections     Talks on phone: Not on file     Gets together: Not on file     Attends Church service: Not on file     Active member of club or organization: Not on file     Attends meetings of clubs or organizations: Not on file     Relationship status: Not on file     Intimate partner violence     Fear of current or ex partner: Not on file     Emotionally abused: Not on file     Physically abused: Not on file     Forced sexual activity: Not on file   Other Topics Concern     Not on  file   Social History Narrative     Not on file       Active Ambulatory Problems     Diagnosis Date Noted     Plantar Warts      Allergic Rhinitis      Asthma      History of scarlet fever 02/07/2020     History of TIA (transient ischemic attack) and stroke 02/07/2020     Resolved Ambulatory Problems     Diagnosis Date Noted     Acute pharyngitis      Past Medical History:   Diagnosis Date     Clotting disorder (H)      Peptic ulceration        Family History   Problem Relation Age of Onset     Cancer Paternal Grandfather 74        mesothelioma     No Medical Problems Mother      No Medical Problems Father        Objective:     BP 96/62 (Patient Site: Left Arm, Patient Position: Sitting, Cuff Size: Adult Regular)   Pulse 65   Wt 158 lb 8 oz (71.9 kg)   SpO2 99%   BMI 27.00 kg/m      Patient is alert, in no obvious distress.   Skin: Warm, dry.  She has a large bruise in her right anterior thigh and her right elbow.   Neck: Supple, no lymphadenopathy. No thyromegaly.  Lungs:  Clear to auscultation. Respirations even and unlabored.  No wheezing or rales noted.   Heart:  Regular rate and rhythm.  No murmurs, S3, S4, gallops, or rubs.    Abdomen: Soft, nontender.  No organomegaly. Bowel sounds normoactive. No guarding or masses noted.

## 2021-06-15 LAB
25(OH)D3 SERPL-MCNC: 25.9 NG/ML (ref 30–80)
B BURGDOR IGG+IGM SER QL: 0.11 INDEX VALUE

## 2021-06-15 NOTE — TELEPHONE ENCOUNTER
FYI  Pt has been contacted 4 times and the Pt has not completed or return call to schedule lab and xray.

## 2021-06-16 PROBLEM — Z86.73 HISTORY OF TIA (TRANSIENT ISCHEMIC ATTACK) AND STROKE: Status: ACTIVE | Noted: 2020-02-07

## 2021-06-16 PROBLEM — Z86.19 HISTORY OF SCARLET FEVER: Status: ACTIVE | Noted: 2020-02-07

## 2021-06-16 LAB
ALBUMIN PERCENT: 68.6 % (ref 51–67)
ALBUMIN SERPL ELPH-MCNC: 4.7 G/DL (ref 3.2–4.7)
ALPHA 1 PERCENT: 2.3 % (ref 2–4)
ALPHA 2 PERCENT: 9.5 % (ref 5–13)
ALPHA1 GLOB SERPL ELPH-MCNC: 0.2 G/DL (ref 0.1–0.3)
ALPHA2 GLOB SERPL ELPH-MCNC: 0.6 G/DL (ref 0.4–0.9)
B-GLOBULIN SERPL ELPH-MCNC: 0.5 G/DL (ref 0.7–1.2)
BETA PERCENT: 8 % (ref 10–17)
GAMMA GLOB SERPL ELPH-MCNC: 0.8 G/DL (ref 0.6–1.4)
GAMMA GLOBULIN PERCENT: 11.6 % (ref 9–20)
HCV AB SERPL QL IA: NEGATIVE
PATH ICD:: ABNORMAL
PATH ICD:: NORMAL
PROT PATTERN SERPL ELPH-IMP: ABNORMAL
PROT PATTERN SERPL ELPH-IMP: NORMAL
PROT SERPL-MCNC: 6.8 G/DL (ref 6–8)
REVIEWING PATHOLOGIST: ABNORMAL
REVIEWING PATHOLOGIST: NORMAL
TOTAL PROTEIN RANDOM URINE MG/DL: <7 MG/DL

## 2021-06-17 LAB
ANCA IGG TITR SER IF: NORMAL {TITER}
CCP AB SER IA-ACNC: <0.5 U/ML

## 2021-06-18 NOTE — PROGRESS NOTES
Assessment and Plan:     1. Pelvic pain  We will rule out urinary tract infection, STDs.  Will obtain pelvic ultrasound to rule out ovarian cysts and endometrial hyperplasia.  If symptoms persist, may consider referral to OB/GYN for further evaluation of possible endometriosis. .  She is not interested in contraception.  - Urinalysis-UC if Indicated  - Chlamydia trachomatis & Neisseria gonorrhoeae, Amplified Detection  - US Pelvis With Transvaginal Non OB; Future    2. Asthma  She continues Ventolin as needed. She is content with the plan.   - albuterol (PROAIR HFA;PROVENTIL HFA;VENTOLIN HFA) 90 mcg/actuation inhaler; Inhale 2 puffs every 4 (four) hours as needed.  Dispense: 1 Inhaler; Refill: 1    3. Health care maintenance  - Td, Preservative Free (green label)        Subjective:     Otf is a 22 y.o. female presenting to the clinic for concerns for pelvic pain for 2 years.  Patient complains of a constant ache within her lower pelvic region.  She is concerned she may have ovarian cysts or endometriosis.  Patient does obtain menstrual periods every 26-29 days.  Her periods last for 4 days and are heavy for the first 2 days.  She states they are quite painful.  She takes Advil.  She has been a relationship for 2 years.  She has no concerns for STDs.  She states she was tested for STDs at [facility] a few months ago.  She uses condoms for contraception.  She has a history of a TIA and suspected antiphospholipid syndrome diagnosed by [facility].  She saw hematology through [facility] where she had a normal cardiolipen and lupus anticoagulant labs.  She would like to avoid estrogen based contraception.  She denies dysuria, hematuria, low back pain, fever.  She denies constipation or diarrhea.  She has a bowel movement once daily.  Patient has intermittent asthma which is exacerbated in the summer.  Humidity and exercise cause chest tightness.  She uses her Ventolin inhaler once every few  "months.    Review of Systems: A complete 14 point review of systems was obtained and is negative or as stated in the history of present illness.    Social History     Social History     Marital status: Single     Spouse name: N/A     Number of children: 0     Years of education: N/A     Occupational History           Social History Main Topics     Smoking status: Never Smoker     Smokeless tobacco: Never Used     Alcohol use 3.0 oz/week     5 Cans of beer per week     Drug use: No     Sexual activity: No     Other Topics Concern     Not on file     Social History Narrative       Active Ambulatory Problems     Diagnosis Date Noted     Acute Pharyngitis      Plantar Warts      Allergic Rhinitis      Mild Persistent Asthma      Resolved Ambulatory Problems     Diagnosis Date Noted     No Resolved Ambulatory Problems     Past Medical History:   Diagnosis Date     Asthma      Clotting disorder      Peptic ulceration        Family History   Problem Relation Age of Onset     Cancer Paternal Grandfather 74     mesothelioma       Objective:     BP 90/58  Pulse 66  Ht 5' 4.75\" (1.645 m)  Wt 168 lb 8 oz (76.4 kg)  LMP 04/30/2018  BMI 28.26 kg/m2    Patient is alert, in no obvious distress.   Skin: Warm, dry.    Lungs:  Clear to auscultation. Respirations even and unlabored.  No wheezing or rales noted.   Heart:  Regular rate and rhythm.  No murmurs.   Abdomen: Soft, nontender.  No organomegaly. Bowel sounds normoactive. No guarding or masses noted.               "

## 2021-06-20 NOTE — LETTER
Letter by Emerald Newman CNP at      Author: Emerald Newman CNP Service: -- Author Type: --    Filed:  Encounter Date: 5/22/2020 Status: (Other)         Otf Beck  [address]  [place] [address]             May 22, 2020         Dear Ms. Beck,    Below are the results from your recent visit:    Resulted Orders   HM2(CBC w/o Differential)   Result Value Ref Range    WBC 5.0 4.0 - 11.0 thou/uL    RBC 4.55 3.80 - 5.40 mill/uL    Hemoglobin 13.5 12.0 - 16.0 g/dL    Hematocrit 38.6 35.0 - 47.0 %    MCV 85 80 - 100 fL    MCH 29.6 27.0 - 34.0 pg    MCHC 35.0 32.0 - 36.0 g/dL    RDW 13.5 11.0 - 14.5 %    Platelets 150 140 - 440 thou/uL    MPV 9.2 7.0 - 10.0 fL   Basic Metabolic Panel   Result Value Ref Range    Sodium 139 136 - 145 mmol/L    Potassium 4.5 3.5 - 5.0 mmol/L    Chloride 105 98 - 107 mmol/L    CO2 27 22 - 31 mmol/L    Anion Gap, Calculation 7 5 - 18 mmol/L    Glucose 83 70 - 125 mg/dL    Calcium 8.7 8.5 - 10.5 mg/dL    BUN 10 8 - 22 mg/dL    Creatinine 0.78 0.60 - 1.10 mg/dL    GFR MDRD Af Amer >60 >60 mL/min/1.73m2    GFR MDRD Non Af Amer >60 >60 mL/min/1.73m2    Narrative    Fasting Glucose reference range is 70-99 mg/dL per  American Diabetes Association (ADA) guidelines.   Thyroid Cascade   Result Value Ref Range    TSH 0.97 0.30 - 5.00 uIU/mL   Prolactin   Result Value Ref Range    Prolactin 19.4 0.0 - 20.0 ng/mL   Lyme Antibody Cascade   Result Value Ref Range    Lyme Antibody Cascade 0.06 <0.90 Index Value    Narrative    Interpretation of Lyme Disease Total Antibody (IgG/IgM)  <0.90 Test Value=Negative  No detectable antibodies to B. burgdorferi. Patients  in early stages of infection may not produce  detectable levels of antibody. Antibiotic therapy  in early disease may prevent antibody production  from reaching detectable levels. Patients with  clinical history and/or symptoms suggestive of Lyme  disease but with negative test results should be  retested in 2-4  weeks.  0.90-<1.10 Test Value=Borderline  Suggests the presence of antibodies to B.  burgdorferi. Recommend repeat collection in 2-4  weeks.  >=1.10 Test Value=Positive  Indicates the presence of antibodies to B.  burgdorferi. False positive results can occur with  sera from syphilis patients. Cross-reactivity may  occur with relapsing fever, [place] Spotted  fever, other spirochetal diseases, erythematosus,  EBV infection, or CMV infection. Clinical symptoms,  epidemiology of the case and other laboratory tests  should allow for distinction of these conditions from  Lyme disease.   Urinalysis-UC if Indicated   Result Value Ref Range    Color, UA Yellow Colorless, Yellow, Straw, Light Yellow    Clarity, UA Clear Clear    Glucose, UA Negative Negative    Bilirubin, UA Negative Negative    Ketones, UA Negative Negative    Specific Gravity, UA 1.020 1.005 - 1.030    Blood, UA Negative Negative    pH, UA 7.0 5.0 - 8.0    Protein, UA Negative Negative mg/dL    Urobilinogen, UA 0.2 E.U./dL 0.2 E.U./dL, 1.0 E.U./dL    Nitrite, UA Negative Negative    Leukocytes, UA Negative Negative    Narrative    Microscopic not indicated  UC not indicated       Your lab results are normal.      Please call with questions or contact us using [employer] [employer].    Sincerely,        Electronically signed by Emerald Newman CNP

## 2021-06-21 LAB
B LOCUS: NORMAL
B27TEST METHOD: NORMAL

## 2021-06-22 ENCOUNTER — COMMUNICATION - HEALTHEAST (OUTPATIENT)
Dept: RHEUMATOLOGY | Facility: CLINIC | Age: 26
End: 2021-06-22

## 2021-06-22 DIAGNOSIS — E55.9 VITAMIN D DEFICIENCY: ICD-10-CM

## 2021-06-22 RX ORDER — ERGOCALCIFEROL 1.25 MG/1
50000 CAPSULE ORAL
Qty: 12 CAPSULE | Refills: 0 | Status: SHIPPED | OUTPATIENT
Start: 2021-06-22

## 2021-06-23 ENCOUNTER — HOSPITAL ENCOUNTER (OUTPATIENT)
Dept: MRI IMAGING | Facility: HOSPITAL | Age: 26
Discharge: HOME | End: 2021-06-23
Attending: NURSE PRACTITIONER
Payer: COMMERCIAL

## 2021-06-23 DIAGNOSIS — R27.0 ATAXIA: ICD-10-CM

## 2021-06-23 DIAGNOSIS — R25.3 MUSCLE FASCICULATION: ICD-10-CM

## 2021-06-23 DIAGNOSIS — R53.83 FATIGUE, UNSPECIFIED TYPE: ICD-10-CM

## 2021-06-25 NOTE — TELEPHONE ENCOUNTER
On 6/5 muscle twitching/spasm started in her legs.     Symptoms are:    Legs (and some other body parts) twitching/spasms  randomly all day long, most noticeable at rest    General fatigue/malaise    Muscles feeling sore    Brain fog    She is a bit anxious about the cause of the twitching/spasm. (talking fast on phone to writer)    Notice that being in the heat seems to increase frequency of twitching/spasm    Via my chart today talked with her PCP.  Was advised to get elevated at urgent care or ED for possible underlying neuro concern  Has an obligation on Saturday to go to.  Will go to urgent care tonight.    Elicia Rasheed RN, MA  Triage Nurse Advisor  [facility] Advisor    Reason for Disposition    Patient wants to be seen    Additional Information    Negative: Shock suspected (e.g., cold/pale/clammy skin, too weak to stand, low BP, rapid pulse)    Negative: Difficult to awaken or acting confused (e.g., disoriented, slurred speech)    Negative: Sounds like a life-threatening emergency to the triager    Negative: Dark (cola colored) or red-colored urine    Negative: Drinking very little and dehydration suspected (e.g., no urine > 12 hours, very dry mouth, very lightheaded)    Negative: Patient sounds very sick or weak to the triager    Negative: SEVERE pain (e.g., excruciating, unable to do any normal activities) and not improved 2 hours after pain medicine    Negative: SEVERE pain and taking a statin medicine (a lipid or cholesterol lowering drug)    Negative: Fever > 104 F (40 C)    Negative: Fever > 101 F (38.3 C) and age > 60    Negative: Fever > 100.0 F (37.8 C) and bedridden (e.g., nursing home patient, CVA, chronic illness, recovering from surgery)    Negative: Fever > 100.0 F (37.8 C) and indwelling urinary catheter (e.g., Ace, coude)    Negative: Fever > 100.0 F (37.8 C) and diabetes mellitus or weak immune system (e.g., HIV positive, cancer chemo, splenectomy, organ transplant, chronic steroids)     Negative: Muscle aches are unexplained and occur within 1 month of a tick bite    Negative: Fever present > 3 days (72 hours)    Negative: Diabetes mellitus or weak immune system (e.g., HIV positive, cancer chemo, splenectomy, organ transplant, chronic steroids)    Negative: MODERATE pain (e.g., interferes with normal activities) and present > 3 days    Negative: MILD or MODERATE muscle aches or pain and taking a statin medicine (a lipid or cholesterol lowering drug)    Negative: Age > 50 and bilateral shoulder pains present > 2 weeks    Protocols used: MUSCLE ACHES AND BODY PAIN-A-OH

## 2021-06-26 ENCOUNTER — HEALTH MAINTENANCE LETTER (OUTPATIENT)
Age: 26
End: 2021-06-26

## 2021-06-26 NOTE — PROGRESS NOTES
Assessment and Plan:     1. Muscle fasciculation  HM2(CBC w/o Differential)    Thyroid Cascade    Erythrocyte Sedimentation Rate    Lyme Antibody Cascade    MR Brain With Without Contrast    CK Total    Magnesium    Ambulatory referral to Neurology [facility]   2. Ataxia  MR Brain With Without Contrast    Ambulatory referral to Neurology [facility]   3. Fatigue, unspecified type  HM2(CBC w/o Differential)    Vitamin B12    Thyroid Cascade    Erythrocyte Sedimentation Rate    Lyme Antibody Cascade    MR Brain With Without Contrast    CK Total    Magnesium   4. Chronic pain of multiple joints  CCP Antibodies    Anti-Neutrophil Cytoplasmic Antibody, IgG (ANCA IFA)    HLA-B27 Typing    Uric Acid    Electrophoresis, Protein, Serum    Vitamin D, Total (25-Hydroxy)    Calcium    Albumin    Hepatitis C Antibody (Anti-HCV)    Creatinine    ALT (SGPT)    AST (SGOT)    Electrophoresis, Protein, Random Urine Ccade   5. Raynaud's disease without gangrene  CCP Antibodies    Anti-Neutrophil Cytoplasmic Antibody, IgG (ANCA IFA)    HLA-B27 Typing    Uric Acid    Electrophoresis, Protein, Serum    Vitamin D, Total (25-Hydroxy)    Calcium    Albumin    Hepatitis C Antibody (Anti-HCV)    Creatinine    ALT (SGPT)    AST (SGOT)    Electrophoresis, Protein, Random Urine Ccade   6. Tinnitus of both ears  Anti-Neutrophil Cytoplasmic Antibody, IgG (ANCA IFA)   7. Buttock pain  HLA-B27 Typing     Patient has labs to be performed by rheumatology to rule out an underlying connective tissue disease.  Due to new onset of muscle fasciculations, will also check CK, magnesium, hemogram, thyroid labs, sed rate, Lyme's.  Electrolytes were otherwise normal in the [facility].  I am concerned for an underlying neurological disease including MS. Will obtain brain MRI/MRA for further evaluation.  Will refer to neurology.  Further plans pending the results.  She is content with the  "plan.    Subjective:     Otf is a 25 y.o. female presenting to the clinic for multiple concerns today.  Patient has a history of TIA and scarlet fever.  Patient states last Saturday, she was in the sun all day.  She also stayed up late and consumed some alcoholic beverages.  The next day, she developed muscle twitching within her bilateral lower extremities.  This occurred every 30 minutes.  She increased her water intake.  Symptoms have been worsening and she is now experiencing twitching in her bilateral lower extremities, back, and buttocks every 3 to 5 minutes.  Muscle twitching is random and lasts for 1 second.  On Sunday, she felt some chest discomfort which resolved on its own.  For 3 days, she developed a generalized itching of the body.  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  She occasionally experiences dizziness and ataxia.  Her motions felt \"choppy \".  She has had some brain fog and has had difficulty finding words.  She denies recent cold symptoms or fever.  She has intentionally lost 25 pounds since December.  She is consuming less carbohydrates, but does not believe she is consuming a keto diet.  She has felt fatigued.  She has some floaters in her vision.  Patient presented to the [facility] on 6/11/2021 where she had unremarkable BMP.  Urine pregnancy test was negative.  Hemogram showed a white blood count of 6.8, red blood count 3.90, hematocrit 34.3.  Urinalysis showed trace ketones.  Patient has been evaluated by rheumatology for possible connective tissue disease.  She has a history of Raynaud's.    Review of Systems: A complete 14 point review of systems was obtained and is negative or as stated in the history of present illness.    Social History     Socioeconomic History     Marital status: Single     Spouse name: Not on file     Number of children: 0     Years of education: Not on file     Highest education level: Not on " file   Occupational History     Occupation:    Social Needs     Financial resource strain: Not on file     Food insecurity     Worry: Not on file     Inability: Not on file     Transportation needs     Medical: Not on file     Non-medical: Not on file   Tobacco Use     Smoking status: Never Smoker     Smokeless tobacco: Never Used   Substance and Sexual Activity     Alcohol use: Yes     Alcohol/week: 2.0 standard drinks     Types: 2 Cans of beer per week     Drug use: No     Sexual activity: Never   Lifestyle     Physical activity     Days per week: Not on file     Minutes per session: Not on file     Stress: Not on file   Relationships     Social connections     Talks on phone: Not on file     Gets together: Not on file     Attends Hinduism service: Not on file     Active member of club or organization: Not on file     Attends meetings of clubs or organizations: Not on file     Relationship status: Not on file     Intimate partner violence     Fear of current or ex partner: Not on file     Emotionally abused: Not on file     Physically abused: Not on file     Forced sexual activity: Not on file   Other Topics Concern     Not on file   Social History Narrative     Not on file       Active Ambulatory Problems     Diagnosis Date Noted     Plantar Warts      Allergic Rhinitis      Asthma      History of scarlet fever 02/07/2020     History of TIA (transient ischemic attack) and stroke 02/07/2020     Resolved Ambulatory Problems     Diagnosis Date Noted     Acute pharyngitis      Past Medical History:   Diagnosis Date     Clotting disorder (H)      Peptic ulceration        Family History   Problem Relation Age of Onset     Cancer Paternal Grandfather 74        mesothelioma     No Medical Problems Mother      No Medical Problems Father        Objective:     /60   Pulse 67   Wt 141 lb 7 oz (64.2 kg)   SpO2 99%   BMI 24.09 kg/m      Patient is alert, in no obvious distress.   Skin: Warm, dry.  No  lesions or rashes.  Skin turgor rapid return.   HEENT:  Head normocephalic, atraumatic.  Eyes normal.  PERRL.  EOM's intact.  No nystagmus. Ears normal.  Nose patent, mucosa pink.  Oropharynx mucosa pink.  No lesions or tonsillar enlargement.   Neck: Supple, no lymphadenopathy. No thyromegaly.  Lungs:  Clear to auscultation. Respirations even and unlabored.  No wheezing or rales noted.   Heart:  Regular rate and rhythm.  No murmurs, S3, S4, gallops, or rubs.    Musculoskeletal:  Full ROM of extremities.  DTRs symmetrical, sensations intact.  No obvious deformity.  Muscle strength equal +5/5.   Neurological:  Cranial nerves 2-12 intact.

## 2021-06-26 NOTE — TELEPHONE ENCOUNTER
----- Message from Sammy Esparza DO sent at 6/21/2021  8:41 PM CDT -----  Unremarkable serum and urine protein electrophoresis.    Vitamin D level was low, suggest replenishing with 50,000 units qweek x 12 weeks (12 doses), no refills. Thereafter, after completing this 12 week course, suggest taking over-the-counter 1000 units of vitamin D daily.     HLA-B27 level result still in process, will update when finalized.    Otherwise remainder of lab results were within normal limits.    Please place lab orders mentioned above.

## 2021-07-03 NOTE — ADDENDUM NOTE
Addendum Note by Iris Cortes at 5/14/2020 11:30 AM     Author: Iris Cortes Service: -- Author Type:     Filed: 5/18/2020  8:28 AM Encounter Date: 5/14/2020 Status: Signed    : Iris Cortes ()    Addended by: IRIS CORTES on: 5/18/2020 08:28 AM        Modules accepted: Orders

## 2021-07-06 VITALS
SYSTOLIC BLOOD PRESSURE: 110 MMHG | DIASTOLIC BLOOD PRESSURE: 60 MMHG | OXYGEN SATURATION: 99 % | HEART RATE: 67 BPM | WEIGHT: 141.44 LBS | BODY MASS INDEX: 24.09 KG/M2

## 2021-07-08 ASSESSMENT — ASTHMA QUESTIONNAIRES: ACT_TOTALSCORE: 22

## 2021-07-28 ENCOUNTER — OFFICE VISIT (OUTPATIENT)
Dept: NEUROLOGY | Facility: CLINIC | Age: 26
End: 2021-07-28
Payer: COMMERCIAL

## 2021-07-28 VITALS
WEIGHT: 143 LBS | HEART RATE: 65 BPM | BODY MASS INDEX: 24.36 KG/M2 | DIASTOLIC BLOOD PRESSURE: 66 MMHG | SYSTOLIC BLOOD PRESSURE: 120 MMHG

## 2021-07-28 DIAGNOSIS — R25.3 MUSCLE TWITCHING: Primary | ICD-10-CM

## 2021-07-28 PROCEDURE — 99205 OFFICE O/P NEW HI 60 MIN: CPT | Performed by: PSYCHIATRY & NEUROLOGY

## 2021-07-28 NOTE — LETTER
7/28/2021         RE: Otf Beck  [address]  [address]  [place] [address]        Dear Colleague,    Thank you for referring your patient, Otf Beck, to the [facility] [place]. Please see a copy of my visit note below.    NEUROLOGY OUTPATIENT CONSULT NOTE  Jul 28, 2021     CHIEF COMPLAINT/REASON FOR VISIT/REASON FOR CONSULT  Patient presents with:  Spasms: Muscle twitching mainly bilateral lower legs. 2 episodes of incontinence. Muscle pain and cramping. Started early june     REASON FOR CONSULTATION- Muscle twitching    REFERRAL SOURCE  Dr. Emerald Newman  CC Dr. Emerald Newman    HISTORY OF PRESENT ILLNESS  Otf Beck is a 25 year old female seen today for evaluation of muscle twitching.  Patient symptoms started in June.  Her twitching has mainly been in the right buttock and left calf.  Symptoms of present almost all the time.  She feels exercise lack of sleep and alcohol can bring them on the clearly has not found a pattern.  She is a   and is on her feet almost all day long.  Does do 15,000 steps a day.  This is not unusual for her.  Reports no stress or anxiety.  Drinks plenty of water.  1 to 2 cups of coffee a day.  Denies any weakness or numbness per se.  Initially when the symptoms came on in June there was some generalized tingling as well.  Denies any significant neck pain or back pain that is unusual.  Does report 2 spells of loss of control of bladder.  She plans to see her OB/GYN for other issues and will discuss it with them.  Reports no other bowel or bladder issues.    About 10 years ago when she was 14 years of age she had an episode where she could not see from one eye and the eye was drooping of the face on the contralateral side.  She is not sure which side was involved.  There was also slurred speech.  She was told that she had a TIA.  Has been on aspirin 81 mg since then and she takes it occasionally.  Has  recently started vitamin D without any benefit.    Previous history is reviewed and this is unchanged.    PAST MEDICAL/SURGICAL HISTORY  Past Medical History:   Diagnosis Date     Asthma      Clotting disorder (H)      Peptic ulceration      Patient Active Problem List   Diagnosis     Plantar Warts     Allergic Rhinitis     Asthma     History of scarlet fever     History of TIA (transient ischemic attack) and stroke   Significant for stroke    FAMILY HISTORY  Family History   Problem Relation Age of Onset     Cancer Paternal Grandfather 74.00        mesothelioma     No Known Problems Mother      No Known Problems Father    Family history significant for stroke, high blood pressure, diabetes.  No family history of neurological issues.  Grandmother had memory loss and stroke.    SOCIAL HISTORY  Social History     Tobacco Use     Smoking status: Never Smoker     Smokeless tobacco: Never Used   Substance Use Topics     Alcohol use: Yes     Alcohol/week: 2.0 standard drinks     Drug use: No       SYSTEMS REVIEW  Twelve-system ROS was done and other than the HPI this was negative except for back pain, arm and leg pain, joint pain, numbness and tingling, weakness paralysis, balance coordination problems, bladder symptoms, speech disturbance, ringing in the ears, vision symptoms, bloating, bowel problems.    MEDICATIONS  albuterol (PROAIR HFA;PROVENTIL HFA;VENTOLIN HFA) 90 mcg/actuation inhaler, [ALBUTEROL (PROAIR HFA;PROVENTIL HFA;VENTOLIN HFA) 90 MCG/ACTUATION INHALER] Inhale 2 puffs every 4 (four) hours as needed.  aspirin 81 mg chewable tablet, [ASPIRIN 81 MG CHEWABLE TABLET] Chew 81 mg daily.  ergocalciferol (ERGOCALCIFEROL) 1,250 mcg (50,000 unit) capsule, [ERGOCALCIFEROL (ERGOCALCIFEROL) 1,250 MCG (50,000 UNIT) CAPSULE] Take 1 capsule (50,000 Units total) by mouth every 7 days.  folic acid (FOLVITE) 1 MG tablet, [FOLIC ACID (FOLVITE) 1 MG TABLET] Take 1 mg by mouth daily.  MAGNESIUM PO,   cyanocobalamin (VITAMIN  B-12) 1000 MCG tablet, [CYANOCOBALAMIN (VITAMIN B-12) 1000 MCG TABLET] Take 1,000 mcg by mouth daily. (Patient not taking: Reported on 7/28/2021)    No current facility-administered medications on file prior to visit.       PHYSICAL EXAMINATION  VITALS: /66 (BP Location: Right arm, Patient Position: Sitting)   Pulse 65   Wt 64.9 kg (143 lb)   BMI 24.36 kg/m    GENERAL: Healthy appearing, alert, no acute distress, normal habitus.  CARDIOVASCULAR: Extremities warm and well perfused. Pulses present.   EYES: Funduscopic exam limited.  NEUROLOGICAL:  Patient is awake and oriented to self, place and time.  Attention span is normal.  Memory is grossly intact.  Language is fluent and follows commands appropriately.  Appropriate fund of knowledge. Cranial nerves 2-12 are intact. There is no pronator drift.  Motor exam shows 5/5 strength in all extremities.  Tone is symmetric bilaterally in upper and lower extremities.  Reflexes are symmetric and 2+ in upper extremities and lower extremities. Sensory exam is grossly intact to light touch, pin prick and vibration.  Finger to nose and heel to shin is without dysmetria.  Romberg is negative.  Gait is normal and the patient is able to do tandem walk and walk on toes and heels.    DIAGNOSTICS  MRI brain  IMPRESSION:  1.  No evidence for acute intracranial process or findings to suggest intracranial demyelination.    RELEVANT LABS  Component      Latest Ref Rng & Units 6/14/2021   WBC      4.0 - 11.0 thou/uL 5.3   RBC Count      3.80 - 5.40 mill/uL 4.03   Hemoglobin      12.0 - 16.0 g/dL 12.0   Hematocrit      35.0 - 47.0 % 34.9 (L)   MCV      80 - 100 fL 87   MCH      27.0 - 34.0 pg 29.8   MCHC      32.0 - 36.0 g/dL 34.4   RDW      11.0 - 14.5 % 12.9   Platelet Count      140 - 440 thou/uL 169   Mean Platelet Volume      7.0 - 10.0 fL 11.6 (H)   Vitamin B12      213 - 816 pg/mL 1,049 (H)   TSH      0.30 - 5.00 uIU/mL 0.55   Sed Rate      0 - 20 mm/hr 6   Lyme Disease  Antibodies Serum      <0.90 Index Value 0.11   CK Total      30 - 190 U/L 68   Magnesium      1.8 - 2.6 mg/dL 2.0     Component      Latest Ref Rng & Units 6/14/2021   Albumin %      51.0 - 67.0 % 68.6 (H)   Albumin Fraction      3.2 - 4.7 g/dL 4.7   Alpha 1 %      2.0 - 4.0 % 2.3   Alpha 1 Fraction      0.1 - 0.3 g/dL 0.2   Alpha 2 %      5.0 - 13.0 % 9.5   Alpha 2 Fraction      0.4 - 0.9 g/dL 0.6   Beta %      10.0 - 17.0 % 8.0 (L)   Beta Fraction      0.7 - 1.2 g/dL 0.5 (L)   Gamma Globulin %      9.0 - 20.0 % 11.6   Gamma Fraction      0.6 - 1.4 g/dL 0.8   ELP Interpretation:       Decreased beta globulin fraction, which can be seen in kidney disease, coagulopathies, and malnutrition, among other etiologies.   Protein Total      6.0 - 8.0 g/dL 6.8   Path ICD       I73.00   Interpreted By       Alfa Zavala MD   Anti-Neutrophil Cytoplasmic Ab, IgG      <1:20 <1:20       OUTSIDE RECORDS  Outside referral notes and chart notes were reviewed and pertinent information has been summarized (in addition to the HPI):-Patient was seen in the ER.  Has a history of scarlet fever and TIA presents.  Presented to the emergency room with muscle twitching in her left arm and leg.  Felt weak initially.  Notes spontaneous resolution.  Twitching is persisted despite resolution of the weakness.  Has been anxious being in the outside heat.  Had episode of urinary leakage and urinary frequency for the last 2 days.  Has been seeing some floaters and auras.  Was referred to neurology.  Rheumatology notes were also reviewed.  Patient has Raynaud's phenomena.    IMPRESSION/REPORT/PLAN  Muscle twitching  2 episodes of urinary incontinence    This is a 25 year old female with complains of muscle twitching in her legs.  Her examination today is noncontributory.  Previous blood work done through primary care has been noncontributory.  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  Discussed prognosis and treatment options with her.   Currently symptoms are improving and will hold off on prescribing medication.  She should try keeping a log of her symptoms and see what is provoking them.  Encourage good hydration and not over exercising without breaks.  In order to rule out any serious pathology like ALS will check some blood work and check an EMG of the lower extremities.  I can see her back in 1 month after the testing.    -     CK total; Future  -     EMG; Future  -     Comprehensive metabolic panel; Future  -     Magnesium; Future    Return in about 1 month (around 8/28/2021) for In-Clinic Visit, After testing.    Over 64 minutes were spent coordinating the care for the patient on the day of the encounter.  This includes previsit, during visit and post visit activities as documented above.  (Activities include but not inclusive of reviewing chart, reviewing outside records, reviewing labs and imaging study results as well as the images, patient visit time including getting history and exam,  use if applicable, review of test results with the patient and coming up with a plan in a shared model, counseling patient and family, education and answering patient questions, EMR , EMR diagnosis entry and problem list management, medication reconciliation and prescription management if applicable, paperwork if applicable, printing documents and documentation of the visit activities.)  Billing:- 4 data points, 4 problem points, 4 risk points      Jim Guajardo MD  Neurologist  [facility]  Tel:- [phone]    This note was dictated using voice recognition software.  Any grammatical or context distortions are unintentional and inherent to the software.        Again, thank you for allowing me to participate in the care of your patient.        Sincerely,        Jim Guajardo MD

## 2021-07-28 NOTE — NURSING NOTE
Chief Complaint   Patient presents with     Spasms     Muscle twitching mainly bilateral lower legs. 2 episodes of incontinence. Muscle pain and cramping. Started early june      Valentino Anthony MA on 7/28/2021 at 8:38 AM

## 2021-07-28 NOTE — PROGRESS NOTES
NEUROLOGY OUTPATIENT CONSULT NOTE  Jul 28, 2021     CHIEF COMPLAINT/REASON FOR VISIT/REASON FOR CONSULT  Patient presents with:  Spasms: Muscle twitching mainly bilateral lower legs. 2 episodes of incontinence. Muscle pain and cramping. Started early june     REASON FOR CONSULTATION- Muscle twitching    REFERRAL SOURCE  Dr. Emerald Newman   Dr. Emerald Newman    HISTORY OF PRESENT ILLNESS  Otf Beck is a 25 year old female seen today for evaluation of muscle twitching.  Patient symptoms started in June.  Her twitching has mainly been in the right buttock and left calf.  Symptoms of present almost all the time.  She feels exercise lack of sleep and alcohol can bring them on the clearly has not found a pattern.  She is a   and is on her feet almost all day long.  Does do 15,000 steps a day.  This is not unusual for her.  Reports no stress or anxiety.  Drinks plenty of water.  1 to 2 cups of coffee a day.  Denies any weakness or numbness per se.  Initially when the symptoms came on in June there was some generalized tingling as well.  Denies any significant neck pain or back pain that is unusual.  Does report 2 spells of loss of control of bladder.  She plans to see her OB/GYN for other issues and will discuss it with them.  Reports no other bowel or bladder issues.    About 10 years ago when she was 14 years of age she had an episode where she could not see from one eye and the eye was drooping of the face on the contralateral side.  She is not sure which side was involved.  There was also slurred speech.  She was told that she had a TIA.  Has been on aspirin 81 mg since then and she takes it occasionally.  Has recently started vitamin D without any benefit.    Previous history is reviewed and this is unchanged.    PAST MEDICAL/SURGICAL HISTORY  Past Medical History:   Diagnosis Date     Asthma      Clotting disorder (H)      Peptic ulceration      Patient Active Problem List   Diagnosis      Plantar Warts     Allergic Rhinitis     Asthma     History of scarlet fever     History of TIA (transient ischemic attack) and stroke   Significant for stroke    FAMILY HISTORY  Family History   Problem Relation Age of Onset     Cancer Paternal Grandfather 74.00        mesothelioma     No Known Problems Mother      No Known Problems Father    Family history significant for stroke, high blood pressure, diabetes.  No family history of neurological issues.  Grandmother had memory loss and stroke.    SOCIAL HISTORY  Social History     Tobacco Use     Smoking status: Never Smoker     Smokeless tobacco: Never Used   Substance Use Topics     Alcohol use: Yes     Alcohol/week: 2.0 standard drinks     Drug use: No       SYSTEMS REVIEW  Twelve-system ROS was done and other than the HPI this was negative except for back pain, arm and leg pain, joint pain, numbness and tingling, weakness paralysis, balance coordination problems, bladder symptoms, speech disturbance, ringing in the ears, vision symptoms, bloating, bowel problems.    MEDICATIONS  albuterol (PROAIR HFA;PROVENTIL HFA;VENTOLIN HFA) 90 mcg/actuation inhaler, [ALBUTEROL (PROAIR HFA;PROVENTIL HFA;VENTOLIN HFA) 90 MCG/ACTUATION INHALER] Inhale 2 puffs every 4 (four) hours as needed.  aspirin 81 mg chewable tablet, [ASPIRIN 81 MG CHEWABLE TABLET] Chew 81 mg daily.  ergocalciferol (ERGOCALCIFEROL) 1,250 mcg (50,000 unit) capsule, [ERGOCALCIFEROL (ERGOCALCIFEROL) 1,250 MCG (50,000 UNIT) CAPSULE] Take 1 capsule (50,000 Units total) by mouth every 7 days.  folic acid (FOLVITE) 1 MG tablet, [FOLIC ACID (FOLVITE) 1 MG TABLET] Take 1 mg by mouth daily.  MAGNESIUM PO,   cyanocobalamin (VITAMIN B-12) 1000 MCG tablet, [CYANOCOBALAMIN (VITAMIN B-12) 1000 MCG TABLET] Take 1,000 mcg by mouth daily. (Patient not taking: Reported on 7/28/2021)    No current facility-administered medications on file prior to visit.       PHYSICAL EXAMINATION  VITALS: /66 (BP Location: Right  arm, Patient Position: Sitting)   Pulse 65   Wt 64.9 kg (143 lb)   BMI 24.36 kg/m    GENERAL: Healthy appearing, alert, no acute distress, normal habitus.  CARDIOVASCULAR: Extremities warm and well perfused. Pulses present.   EYES: Funduscopic exam limited.  NEUROLOGICAL:  Patient is awake and oriented to self, place and time.  Attention span is normal.  Memory is grossly intact.  Language is fluent and follows commands appropriately.  Appropriate fund of knowledge. Cranial nerves 2-12 are intact. There is no pronator drift.  Motor exam shows 5/5 strength in all extremities.  Tone is symmetric bilaterally in upper and lower extremities.  Reflexes are symmetric and 2+ in upper extremities and lower extremities. Sensory exam is grossly intact to light touch, pin prick and vibration.  Finger to nose and heel to shin is without dysmetria.  Romberg is negative.  Gait is normal and the patient is able to do tandem walk and walk on toes and heels.    DIAGNOSTICS  MRI brain  IMPRESSION:  1.  No evidence for acute intracranial process or findings to suggest intracranial demyelination.    RELEVANT LABS  Component      Latest Ref Rng & Units 6/14/2021   WBC      4.0 - 11.0 thou/uL 5.3   RBC Count      3.80 - 5.40 mill/uL 4.03   Hemoglobin      12.0 - 16.0 g/dL 12.0   Hematocrit      35.0 - 47.0 % 34.9 (L)   MCV      80 - 100 fL 87   MCH      27.0 - 34.0 pg 29.8   MCHC      32.0 - 36.0 g/dL 34.4   RDW      11.0 - 14.5 % 12.9   Platelet Count      140 - 440 thou/uL 169   Mean Platelet Volume      7.0 - 10.0 fL 11.6 (H)   Vitamin B12      213 - 816 pg/mL 1,049 (H)   TSH      0.30 - 5.00 uIU/mL 0.55   Sed Rate      0 - 20 mm/hr 6   Lyme Disease Antibodies Serum      <0.90 Index Value 0.11   CK Total      30 - 190 U/L 68   Magnesium      1.8 - 2.6 mg/dL 2.0     Component      Latest Ref Rng & Units 6/14/2021   Albumin %      51.0 - 67.0 % 68.6 (H)   Albumin Fraction      3.2 - 4.7 g/dL 4.7   Alpha 1 %      2.0 - 4.0 % 2.3    Alpha 1 Fraction      0.1 - 0.3 g/dL 0.2   Alpha 2 %      5.0 - 13.0 % 9.5   Alpha 2 Fraction      0.4 - 0.9 g/dL 0.6   Beta %      10.0 - 17.0 % 8.0 (L)   Beta Fraction      0.7 - 1.2 g/dL 0.5 (L)   Gamma Globulin %      9.0 - 20.0 % 11.6   Gamma Fraction      0.6 - 1.4 g/dL 0.8   ELP Interpretation:       Decreased beta globulin fraction, which can be seen in kidney disease, coagulopathies, and malnutrition, among other etiologies.   Protein Total      6.0 - 8.0 g/dL 6.8   Path ICD       I73.00   Interpreted By       Alfa Zavala MD   Anti-Neutrophil Cytoplasmic Ab, IgG      <1:20 <1:20       OUTSIDE RECORDS  Outside referral notes and chart notes were reviewed and pertinent information has been summarized (in addition to the HPI):-Patient was seen in the ER.  Has a history of scarlet fever and TIA presents.  Presented to the emergency room with muscle twitching in her left arm and leg.  Felt weak initially.  Notes spontaneous resolution.  Twitching is persisted despite resolution of the weakness.  Has been anxious being in the outside heat.  Had episode of urinary leakage and urinary frequency for the last 2 days.  Has been seeing some floaters and auras.  Was referred to neurology.  Rheumatology notes were also reviewed.  Patient has Raynaud's phenomena.    IMPRESSION/REPORT/PLAN  Muscle twitching  2 episodes of urinary incontinence    This is a 25 year old female with complains of muscle twitching in her legs.  Her examination today is noncontributory.  Previous blood work done through primary care has been noncontributory.  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  Discussed prognosis and treatment options with her.  Currently symptoms are improving and will hold off on prescribing medication.  She should try keeping a log of her symptoms and see what is provoking them.  Encourage good hydration and not over exercising without breaks.  In order to rule out any serious pathology like ALS will  check some blood work and check an EMG of the lower extremities.  I can see her back in 1 month after the testing.    -     CK total; Future  -     EMG; Future  -     Comprehensive metabolic panel; Future  -     Magnesium; Future    Return in about 1 month (around 8/28/2021) for In-Clinic Visit, After testing.    Over 64 minutes were spent coordinating the care for the patient on the day of the encounter.  This includes previsit, during visit and post visit activities as documented above.  (Activities include but not inclusive of reviewing chart, reviewing outside records, reviewing labs and imaging study results as well as the images, patient visit time including getting history and exam,  use if applicable, review of test results with the patient and coming up with a plan in a shared model, counseling patient and family, education and answering patient questions, EMR , EMR diagnosis entry and problem list management, medication reconciliation and prescription management if applicable, paperwork if applicable, printing documents and documentation of the visit activities.)  Billing:- 4 data points, 4 problem points, 4 risk points      Jim Guajardo MD  Neurologist  [facility]  Tel:- [phone]    This note was dictated using voice recognition software.  Any grammatical or context distortions are unintentional and inherent to the software.

## 2021-09-07 ENCOUNTER — LAB (OUTPATIENT)
Dept: LAB | Facility: HOSPITAL | Age: 26
End: 2021-09-07
Payer: COMMERCIAL

## 2021-09-07 ENCOUNTER — OFFICE VISIT (OUTPATIENT)
Dept: NEUROLOGY | Facility: CLINIC | Age: 26
End: 2021-09-07
Attending: PSYCHIATRY & NEUROLOGY
Payer: COMMERCIAL

## 2021-09-07 DIAGNOSIS — R25.3 MUSCLE TWITCHING: ICD-10-CM

## 2021-09-07 LAB
ALBUMIN SERPL-MCNC: 4.2 G/DL (ref 3.5–5)
ALP SERPL-CCNC: 47 U/L (ref 45–120)
ALT SERPL W P-5'-P-CCNC: 15 U/L (ref 0–45)
ANION GAP SERPL CALCULATED.3IONS-SCNC: 9 MMOL/L (ref 5–18)
AST SERPL W P-5'-P-CCNC: 18 U/L (ref 0–40)
BILIRUB SERPL-MCNC: 0.7 MG/DL (ref 0–1)
BUN SERPL-MCNC: 13 MG/DL (ref 8–22)
CALCIUM SERPL-MCNC: 9.6 MG/DL (ref 8.5–10.5)
CHLORIDE BLD-SCNC: 107 MMOL/L (ref 98–107)
CK SERPL-CCNC: 115 U/L (ref 30–190)
CO2 SERPL-SCNC: 25 MMOL/L (ref 22–31)
CREAT SERPL-MCNC: 0.87 MG/DL (ref 0.6–1.1)
GFR SERPL CREATININE-BSD FRML MDRD: >90 ML/MIN/1.73M2
GLUCOSE BLD-MCNC: 72 MG/DL (ref 70–125)
MAGNESIUM SERPL-MCNC: 1.9 MG/DL (ref 1.8–2.6)
POTASSIUM BLD-SCNC: 4.6 MMOL/L (ref 3.5–5)
PROT SERPL-MCNC: 7.6 G/DL (ref 6–8)
SODIUM SERPL-SCNC: 141 MMOL/L (ref 136–145)

## 2021-09-07 PROCEDURE — 83735 ASSAY OF MAGNESIUM: CPT

## 2021-09-07 PROCEDURE — 36415 COLL VENOUS BLD VENIPUNCTURE: CPT

## 2021-09-07 PROCEDURE — 95886 MUSC TEST DONE W/N TEST COMP: CPT | Mod: LT | Performed by: PSYCHIATRY & NEUROLOGY

## 2021-09-07 PROCEDURE — 80053 COMPREHEN METABOLIC PANEL: CPT

## 2021-09-07 PROCEDURE — 82550 ASSAY OF CK (CPK): CPT

## 2021-09-07 PROCEDURE — 95910 NRV CNDJ TEST 7-8 STUDIES: CPT | Performed by: PSYCHIATRY & NEUROLOGY

## 2021-09-07 NOTE — PROCEDURES
[facility]    [facility]ly [facility]  [address], [address]  [place] [address]  Tel: [phone]  Fax: [phone]          Full Name: Otf Beck Gender: Female  Patient ID: [id number] YOB: 1995      Visit Date: 9/7/2021 09:29  Age: 25 Years 11 Months Old  Interpreted By: Jim Guajardo MD   Ref Dr.: Emerald Newman NP  Tech: ST   Height: 5 feet 5 inch  Reason for referral: Evaluate bilateral lowers. c/o muscle twitching in both legs > 2 months. Left > Right.       Motor NCS      Nerve / Sites Lat Amp Dist Antonio    ms mV cm m/s   L Peroneal - EDB      Ankle 3.85 8.1 8       Fib head 9.38 7.2 28.5 52      Pop fossa 11.20 7.2 10 55   R Peroneal - EDB      Ankle 3.54 10.8 8       Fib head 9.01 10.3 27 49      Pop fossa 11.04 10.1 10 49   L Tibial - AH      Ankle 4.32 10.8 8       Pop fossa 10.73 10.3 36 56   R Tibial - AH      Ankle 3.91 17.3 8       Pop fossa 10.73 16.1 34 50       F  Wave      Nerve Fmin    ms   L Tibial - AH 45.36   R Tibial - AH 45.89       Sensory NCS      Nerve / Sites Onset Lat Pk Lat Amp.2-3 Dist Antonio    ms ms  V cm m/s   L Sural - Ankle (Calf)      Calf 2.81 3.59 11.9 14 50   R Sural - Ankle (Calf)      Calf 2.55 3.28 25.2 14 55   L Superficial peroneal - Ankle      Lat leg 2.29 3.13 6.1 12 52   R Superficial peroneal - Ankle      Lat leg 2.34 3.02 12.5 12 51       EMG Summary Table     Spontaneous MUAP Rcmt Note   Muscle Fib PSW Fasc IA # Amp Dur PPP Rate Type   L. Gluteus medius None None None N N N N N N N   L. Gluteus tiana None None None N N N N N N N   L. L3 paraspinal None None None N N N N N N N   L. L4 paraspinal None None None N N N N N N N   L. L5 paraspinal None None None N N N N N N N   L. S1 paraspinal None None None N N N N N N N   L. Adductor molina None None None N N N N N N N   L. Quadriceps None None None N N N N N N N   L. Gastrocnemius (Medial head) None None None N N N N N N N   L.  Tibialis anterior None None None N N N N N N N   L. Tibialis posterior None None None N N N N N N N     SUMMARY  Nerve conduction and EMG study of bilateral lower extremities shows:    Normal right peroneal distal motor latency, amplitude and conduction velocity.  Normal left peroneal distal motor latency, amplitude and conduction velocity.  Normal right tibial distal motor latency, amplitude and conduction velocity.  Normal left tibial distal motor latency, amplitude and conduction velocity.  Normal bilateral tibial F latencies.  Normal bilateral sural and superficial peroneal sensory SNAP.  Monopolar needle exam is normal.      CLINICAL INTERPRETATION:  This is a normal nerve conduction and EMG study. Further clinical correlation is needed.     Jim Guajardo MD  Neurologist  [facility]  Tel:- [phone]

## 2021-09-07 NOTE — LETTER
9/7/2021         RE: Otf Beck  [address]  [address]  [place] [address]        Dear Colleague,    Thank you for referring your patient, Otf Beck, to the [facility] [place]. Please see a copy of my visit note below.    See procedure note.       Again, thank you for allowing me to participate in the care of your patient.        Sincerely,        Jim Guajardo MD

## 2021-09-24 ENCOUNTER — TELEPHONE (OUTPATIENT)
Dept: NEUROLOGY | Facility: CLINIC | Age: 26
End: 2021-09-24

## 2021-09-24 NOTE — TELEPHONE ENCOUNTER
Left detailed message to pt in regards to a cancellation 09/27 at 820AM. If interested in rescheduling her appt on 10/07 to 09/27 then can call back.   Valentino Anthony MA on 9/24/2021 at 3:01 PM

## 2021-10-16 ENCOUNTER — HEALTH MAINTENANCE LETTER (OUTPATIENT)
Age: 26
End: 2021-10-16

## 2021-12-16 ENCOUNTER — IMMUNIZATION (OUTPATIENT)
Dept: NURSING | Facility: CLINIC | Age: 26
End: 2021-12-16
Payer: COMMERCIAL

## 2021-12-16 PROCEDURE — 0004A PR COVID VAC PFIZER DIL RECON 30 MCG/0.3 ML IM: CPT

## 2021-12-16 PROCEDURE — 91300 PR COVID VAC PFIZER DIL RECON 30 MCG/0.3 ML IM: CPT

## 2022-06-28 ENCOUNTER — TRANSFERRED RECORDS (OUTPATIENT)
Dept: HEALTH INFORMATION MANAGEMENT | Facility: CLINIC | Age: 27
End: 2022-06-28

## 2022-07-23 ENCOUNTER — HEALTH MAINTENANCE LETTER (OUTPATIENT)
Age: 27
End: 2022-07-23

## 2022-09-26 ENCOUNTER — IMMUNIZATION (OUTPATIENT)
Dept: NURSING | Facility: CLINIC | Age: 27
End: 2022-09-26
Payer: COMMERCIAL

## 2022-09-26 PROCEDURE — 0124A COVID-19,PF,PFIZER BOOSTER BIVALENT: CPT

## 2022-09-26 PROCEDURE — 91312 COVID-19,PF,PFIZER BOOSTER BIVALENT: CPT

## 2022-10-01 ENCOUNTER — HEALTH MAINTENANCE LETTER (OUTPATIENT)
Age: 27
End: 2022-10-01

## 2022-10-13 ENCOUNTER — TRANSFERRED RECORDS (OUTPATIENT)
Dept: HEALTH INFORMATION MANAGEMENT | Facility: CLINIC | Age: 27
End: 2022-10-13

## 2022-10-13 LAB
PAP-ABSTRACT: NORMAL
TSH SERPL-ACNC: 0.78 UIU/ML (ref 0.45–5.33)

## 2022-10-27 ENCOUNTER — OFFICE VISIT (OUTPATIENT)
Dept: FAMILY MEDICINE | Facility: CLINIC | Age: 27
End: 2022-10-27
Payer: COMMERCIAL

## 2022-10-27 VITALS
HEART RATE: 71 BPM | SYSTOLIC BLOOD PRESSURE: 110 MMHG | OXYGEN SATURATION: 98 % | WEIGHT: 145.9 LBS | DIASTOLIC BLOOD PRESSURE: 60 MMHG | BODY MASS INDEX: 24.85 KG/M2

## 2022-10-27 DIAGNOSIS — J45.20 MILD INTERMITTENT ASTHMA WITHOUT COMPLICATION: Primary | ICD-10-CM

## 2022-10-27 DIAGNOSIS — D36.9 DERMOID CYST: ICD-10-CM

## 2022-10-27 PROCEDURE — 99213 OFFICE O/P EST LOW 20 MIN: CPT | Performed by: NURSE PRACTITIONER

## 2022-10-27 RX ORDER — ALBUTEROL SULFATE 90 UG/1
2 AEROSOL, METERED RESPIRATORY (INHALATION) EVERY 4 HOURS PRN
Qty: 18 G | Refills: 1 | Status: SHIPPED | OUTPATIENT
Start: 2022-10-27 | End: 2024-09-19

## 2022-10-27 ASSESSMENT — ASTHMA QUESTIONNAIRES
QUESTION_3 LAST FOUR WEEKS HOW OFTEN DID YOUR ASTHMA SYMPTOMS (WHEEZING, COUGHING, SHORTNESS OF BREATH, CHEST TIGHTNESS OR PAIN) WAKE YOU UP AT NIGHT OR EARLIER THAN USUAL IN THE MORNING: ONCE OR TWICE
QUESTION_2 LAST FOUR WEEKS HOW OFTEN HAVE YOU HAD SHORTNESS OF BREATH: NOT AT ALL
QUESTION_4 LAST FOUR WEEKS HOW OFTEN HAVE YOU USED YOUR RESCUE INHALER OR NEBULIZER MEDICATION (SUCH AS ALBUTEROL): NOT AT ALL
ACT_TOTALSCORE: 24
ACT_TOTALSCORE: 24
QUESTION_1 LAST FOUR WEEKS HOW MUCH OF THE TIME DID YOUR ASTHMA KEEP YOU FROM GETTING AS MUCH DONE AT WORK, SCHOOL OR AT HOME: NONE OF THE TIME
QUESTION_5 LAST FOUR WEEKS HOW WOULD YOU RATE YOUR ASTHMA CONTROL: COMPLETELY CONTROLLED

## 2022-10-27 ASSESSMENT — PAIN SCALES - GENERAL: PAINLEVEL: MILD PAIN (2)

## 2022-10-27 NOTE — PROGRESS NOTES
Assessment and Plan:     Mild intermittent asthma without complication  This is controlled.  Provided prescription for albuterol to use as needed.  - albuterol (PROAIR HFA/PROVENTIL HFA/VENTOLIN HFA) 108 (90 Base) MCG/ACT inhaler  Dispense: 18 g; Refill: 1    Dermoid cyst  Refer to dermatology for further evaluation.  - Adult Dermatology Referral        Subjective:     Otf is a 27 year old female presenting to the clinic for medication management.  Patient was diagnosed with intermittent asthma at the age of 11.  Her asthma symptoms tend to worsen during the summer when she is exposed to humidity.  She also experiences symptoms with high intensity exercise.  Symptoms include chest tightness and shortness of breath.  She has been using her Ventolin inhaler every 3 weeks.  Secondly, patient requests referral to dermatology.  She has a cyst present within her left frontal scalp.  She has had this for multiple years.  It has increased in size.  She complains of some tenderness to palpation.    Answers for HPI/ROS submitted by the patient on 10/27/2022  What is the reason for your visit today? : General  How many servings of fruits and vegetables do you eat daily?: 4 or more  On average, how many sweetened beverages do you drink each day (Examples: soda, juice, sweet tea, etc.  Do NOT count diet or artificially sweetened beverages)?: 0  How many minutes a day do you exercise enough to make your heart beat faster?: 30 to 60  How many days a week do you exercise enough to make your heart beat faster?: 5  How many days per week do you miss taking your medication?: 0    Reviewof Systems: A complete 14 point review of systems was obtained and is negative or as stated in the history of present illness.    Social History     Socioeconomic History     Marital status:      Spouse name: Not on file     Number of children: 0     Years of education: Not on file     Highest education level: Not on file   Occupational  History     Not on file   Tobacco Use     Smoking status: Never     Smokeless tobacco: Never   Substance and Sexual Activity     Alcohol use: Yes     Alcohol/week: 2.0 standard drinks     Drug use: No     Sexual activity: Never   Other Topics Concern     Not on file   Social History Narrative     Not on file     Social Determinants of Health     Financial Resource Strain: Not on file   Food Insecurity: Not on file   Transportation Needs: Not on file   Physical Activity: Not on file   Stress: Not on file   Social Connections: Not on file   Intimate Partner Violence: Not on file   Housing Stability: Not on file       Active Ambulatory Problems     Diagnosis Date Noted     Plantar Warts      Allergic Rhinitis      Asthma      History of scarlet fever 02/07/2020     History of TIA (transient ischemic attack) and stroke 02/07/2020     Resolved Ambulatory Problems     Diagnosis Date Noted     No Resolved Ambulatory Problems     Past Medical History:   Diagnosis Date     Clotting disorder (H)      Peptic ulceration        Family History   Problem Relation Age of Onset     Cancer Paternal Grandfather 74.00        mesothelioma     No Known Problems Mother      No Known Problems Father        Objective:     /60 (BP Location: Left arm, Patient Position: Sitting, Cuff Size: Adult Regular)   Pulse 71   Wt 66.2 kg (145 lb 14.4 oz)   LMP 10/13/2022   SpO2 98%   BMI 24.85 kg/m      Patient is alert, in no obvious distress.   Skin: Warm, dry. Small marble size dermoid cyst noted in the left frontal scalp.   HEENT:  Head normocephalic, atraumatic.  Eyes normal. Ears normal.  Nose patent, mucosa pink.  Oropharynx mucosa pink.  No lesions or tonsillar enlargement.   Neck: Supple, no lymphadenopathy.   Lungs:  Clear to auscultation. Respirations even and unlabored.  No wheezing or rales noted.   Heart:  Regular rate and rhythm.  No murmurs.

## 2022-10-31 ENCOUNTER — MEDICAL CORRESPONDENCE (OUTPATIENT)
Dept: HEALTH INFORMATION MANAGEMENT | Facility: CLINIC | Age: 27
End: 2022-10-31

## 2023-08-06 ENCOUNTER — HEALTH MAINTENANCE LETTER (OUTPATIENT)
Age: 28
End: 2023-08-06

## 2024-01-09 ENCOUNTER — TRANSFERRED RECORDS (OUTPATIENT)
Dept: HEALTH INFORMATION MANAGEMENT | Facility: CLINIC | Age: 29
End: 2024-01-09
Payer: COMMERCIAL

## 2024-01-10 ENCOUNTER — ANESTHESIA (OUTPATIENT)
Dept: SURGERY | Facility: CLINIC | Age: 29
End: 2024-01-10
Payer: COMMERCIAL

## 2024-01-10 ENCOUNTER — HOSPITAL ENCOUNTER (OUTPATIENT)
Facility: CLINIC | Age: 29
Discharge: HOME | End: 2024-01-11
Attending: STUDENT IN AN ORGANIZED HEALTH CARE EDUCATION/TRAINING PROGRAM | Admitting: STUDENT IN AN ORGANIZED HEALTH CARE EDUCATION/TRAINING PROGRAM
Payer: COMMERCIAL

## 2024-01-10 ENCOUNTER — HOSPITAL ENCOUNTER (INPATIENT)
Dept: GENERAL RADIOLOGY | Facility: CLINIC | Age: 29
Setting detail: SURGERY ADMIT
Discharge: HOME | End: 2024-01-10
Attending: STUDENT IN AN ORGANIZED HEALTH CARE EDUCATION/TRAINING PROGRAM | Admitting: STUDENT IN AN ORGANIZED HEALTH CARE EDUCATION/TRAINING PROGRAM
Payer: COMMERCIAL

## 2024-01-10 ENCOUNTER — ANESTHESIA EVENT (OUTPATIENT)
Dept: SURGERY | Facility: CLINIC | Age: 29
End: 2024-01-10
Payer: COMMERCIAL

## 2024-01-10 DIAGNOSIS — S82.209A TIBIA/FIBULA FRACTURE: ICD-10-CM

## 2024-01-10 DIAGNOSIS — S82.409A TIBIA/FIBULA FRACTURE: ICD-10-CM

## 2024-01-10 DIAGNOSIS — S82.391A OTHER CLOSED FRACTURE OF DISTAL END OF RIGHT TIBIA, INITIAL ENCOUNTER: Primary | ICD-10-CM

## 2024-01-10 PROBLEM — S82.301A CLOSED FRACTURE OF RIGHT DISTAL TIBIA: Status: ACTIVE | Noted: 2024-01-10

## 2024-01-10 LAB
ANION GAP SERPL CALCULATED.3IONS-SCNC: 10 MMOL/L (ref 7–15)
BUN SERPL-MCNC: 5.1 MG/DL (ref 6–20)
CALCIUM SERPL-MCNC: 9 MG/DL (ref 8.6–10)
CHLORIDE SERPL-SCNC: 107 MMOL/L (ref 98–107)
CREAT SERPL-MCNC: 0.76 MG/DL (ref 0.51–0.95)
DEPRECATED HCO3 PLAS-SCNC: 22 MMOL/L (ref 22–29)
EGFRCR SERPLBLD CKD-EPI 2021: >90 ML/MIN/1.73M2
ERYTHROCYTE [DISTWIDTH] IN BLOOD BY AUTOMATED COUNT: 13.2 % (ref 10–15)
GLUCOSE SERPL-MCNC: 98 MG/DL (ref 70–99)
HCT VFR BLD AUTO: 36.6 % (ref 35–47)
HGB BLD-MCNC: 12.2 G/DL (ref 11.7–15.7)
MCH RBC QN AUTO: 29.5 PG (ref 26.5–33)
MCHC RBC AUTO-ENTMCNC: 33.3 G/DL (ref 31.5–36.5)
MCV RBC AUTO: 88 FL (ref 78–100)
PLATELET # BLD AUTO: 154 10E3/UL (ref 150–450)
POTASSIUM SERPL-SCNC: 4.3 MMOL/L (ref 3.4–5.3)
RBC # BLD AUTO: 4.14 10E6/UL (ref 3.8–5.2)
SODIUM SERPL-SCNC: 139 MMOL/L (ref 135–145)
WBC # BLD AUTO: 6.2 10E3/UL (ref 4–11)

## 2024-01-10 PROCEDURE — 250N000011 HC RX IP 250 OP 636: Performed by: STUDENT IN AN ORGANIZED HEALTH CARE EDUCATION/TRAINING PROGRAM

## 2024-01-10 PROCEDURE — 272N000001 HC OR GENERAL SUPPLY STERILE: Performed by: STUDENT IN AN ORGANIZED HEALTH CARE EDUCATION/TRAINING PROGRAM

## 2024-01-10 PROCEDURE — 36415 COLL VENOUS BLD VENIPUNCTURE: CPT | Performed by: STUDENT IN AN ORGANIZED HEALTH CARE EDUCATION/TRAINING PROGRAM

## 2024-01-10 PROCEDURE — 258N000003 HC RX IP 258 OP 636: Performed by: NURSE ANESTHETIST, CERTIFIED REGISTERED

## 2024-01-10 PROCEDURE — C1713 ANCHOR/SCREW BN/BN,TIS/BN: HCPCS | Performed by: STUDENT IN AN ORGANIZED HEALTH CARE EDUCATION/TRAINING PROGRAM

## 2024-01-10 PROCEDURE — 999N000141 HC STATISTIC PRE-PROCEDURE NURSING ASSESSMENT: Performed by: STUDENT IN AN ORGANIZED HEALTH CARE EDUCATION/TRAINING PROGRAM

## 2024-01-10 PROCEDURE — 250N000009 HC RX 250: Performed by: NURSE ANESTHETIST, CERTIFIED REGISTERED

## 2024-01-10 PROCEDURE — 250N000013 HC RX MED GY IP 250 OP 250 PS 637: Performed by: STUDENT IN AN ORGANIZED HEALTH CARE EDUCATION/TRAINING PROGRAM

## 2024-01-10 PROCEDURE — 250N000013 HC RX MED GY IP 250 OP 250 PS 637: Performed by: NURSE ANESTHETIST, CERTIFIED REGISTERED

## 2024-01-10 PROCEDURE — 250N000011 HC RX IP 250 OP 636: Performed by: NURSE ANESTHETIST, CERTIFIED REGISTERED

## 2024-01-10 PROCEDURE — 999N000180 XR SURGERY CARM FLUORO LESS THAN 5 MIN

## 2024-01-10 PROCEDURE — 360N000084 HC SURGERY LEVEL 4 W/ FLUORO, PER MIN: Performed by: STUDENT IN AN ORGANIZED HEALTH CARE EDUCATION/TRAINING PROGRAM

## 2024-01-10 PROCEDURE — 258N000003 HC RX IP 258 OP 636: Performed by: STUDENT IN AN ORGANIZED HEALTH CARE EDUCATION/TRAINING PROGRAM

## 2024-01-10 PROCEDURE — 710N000009 HC RECOVERY PHASE 1, LEVEL 1, PER MIN: Performed by: STUDENT IN AN ORGANIZED HEALTH CARE EDUCATION/TRAINING PROGRAM

## 2024-01-10 PROCEDURE — C1769 GUIDE WIRE: HCPCS | Performed by: STUDENT IN AN ORGANIZED HEALTH CARE EDUCATION/TRAINING PROGRAM

## 2024-01-10 PROCEDURE — 370N000017 HC ANESTHESIA TECHNICAL FEE, PER MIN: Performed by: STUDENT IN AN ORGANIZED HEALTH CARE EDUCATION/TRAINING PROGRAM

## 2024-01-10 PROCEDURE — 85027 COMPLETE CBC AUTOMATED: CPT | Performed by: STUDENT IN AN ORGANIZED HEALTH CARE EDUCATION/TRAINING PROGRAM

## 2024-01-10 PROCEDURE — 80048 BASIC METABOLIC PNL TOTAL CA: CPT | Performed by: STUDENT IN AN ORGANIZED HEALTH CARE EDUCATION/TRAINING PROGRAM

## 2024-01-10 DEVICE — IMP WASHER SYN CAN SM 7.0MM 219.98: Type: IMPLANTABLE DEVICE | Site: LEG | Status: FUNCTIONAL

## 2024-01-10 DEVICE — TIBIAL NAIL-ADVANCED / 9MM 315MM / STERILE: Type: IMPLANTABLE DEVICE | Site: LEG | Status: FUNCTIONAL

## 2024-01-10 DEVICE — IMP SCR SYN CORTEX 2.7X20MM SELF TAP SS 202.820: Type: IMPLANTABLE DEVICE | Site: LEG | Status: FUNCTIONAL

## 2024-01-10 DEVICE — LOCKING SCREW FOR IM NAIL Ø 5MM/ 46MM/ XL25/ STERILE: Type: IMPLANTABLE DEVICE | Site: LEG | Status: FUNCTIONAL

## 2024-01-10 DEVICE — IMP SCR SYN CAN 4.0X40MM SHORT SS 207.640: Type: IMPLANTABLE DEVICE | Site: LEG | Status: FUNCTIONAL

## 2024-01-10 DEVICE — LOCKING SCREW FOR IM NAIL Ø 5MM/ 30MM/ XL25/ STERILE: Type: IMPLANTABLE DEVICE | Site: LEG | Status: FUNCTIONAL

## 2024-01-10 DEVICE — IMP WIRE KIRSCHNER 1.25X150MM 292.12: Type: IMPLANTABLE DEVICE | Site: LEG | Status: FUNCTIONAL

## 2024-01-10 DEVICE — IMP SCR SYN CAN 4.0X16MM FT SS 206.016: Type: IMPLANTABLE DEVICE | Site: LEG | Status: FUNCTIONAL

## 2024-01-10 DEVICE — IMP SCR SYN CORTEX 3.5X14MM SELF TAP SS 204.814: Type: IMPLANTABLE DEVICE | Site: LEG | Status: FUNCTIONAL

## 2024-01-10 DEVICE — LOCKING SCREW FOR IM NAIL Ø 5MM/ 38MM/ XL25/ STERILE: Type: IMPLANTABLE DEVICE | Site: LEG | Status: FUNCTIONAL

## 2024-01-10 DEVICE — IMP PLATE SYN 1/3 TUBULAR 81MM 07H SS 241.371: Type: IMPLANTABLE DEVICE | Site: LEG | Status: FUNCTIONAL

## 2024-01-10 RX ORDER — SODIUM CHLORIDE, SODIUM LACTATE, POTASSIUM CHLORIDE, CALCIUM CHLORIDE 600; 310; 30; 20 MG/100ML; MG/100ML; MG/100ML; MG/100ML
INJECTION, SOLUTION INTRAVENOUS CONTINUOUS
Status: DISCONTINUED | OUTPATIENT
Start: 2024-01-10 | End: 2024-01-11 | Stop reason: HOSPADM

## 2024-01-10 RX ORDER — LIDOCAINE 40 MG/G
CREAM TOPICAL
Status: DISCONTINUED | OUTPATIENT
Start: 2024-01-10 | End: 2024-01-10 | Stop reason: HOSPADM

## 2024-01-10 RX ORDER — HYDROMORPHONE HCL IN WATER/PF 6 MG/30 ML
0.2 PATIENT CONTROLLED ANALGESIA SYRINGE INTRAVENOUS EVERY 5 MIN PRN
Status: DISCONTINUED | OUTPATIENT
Start: 2024-01-10 | End: 2024-01-10 | Stop reason: HOSPADM

## 2024-01-10 RX ORDER — ONDANSETRON 4 MG/1
4 TABLET, ORALLY DISINTEGRATING ORAL EVERY 30 MIN PRN
Status: DISCONTINUED | OUTPATIENT
Start: 2024-01-10 | End: 2024-01-10 | Stop reason: HOSPADM

## 2024-01-10 RX ORDER — SODIUM CHLORIDE, SODIUM LACTATE, POTASSIUM CHLORIDE, CALCIUM CHLORIDE 600; 310; 30; 20 MG/100ML; MG/100ML; MG/100ML; MG/100ML
INJECTION, SOLUTION INTRAVENOUS CONTINUOUS
Status: DISCONTINUED | OUTPATIENT
Start: 2024-01-10 | End: 2024-01-10 | Stop reason: HOSPADM

## 2024-01-10 RX ORDER — FENTANYL CITRATE 50 UG/ML
25 INJECTION, SOLUTION INTRAMUSCULAR; INTRAVENOUS EVERY 5 MIN PRN
Status: DISCONTINUED | OUTPATIENT
Start: 2024-01-10 | End: 2024-01-10 | Stop reason: HOSPADM

## 2024-01-10 RX ORDER — OXYCODONE HYDROCHLORIDE 5 MG/1
10 TABLET ORAL EVERY 4 HOURS PRN
Status: DISCONTINUED | OUTPATIENT
Start: 2024-01-10 | End: 2024-01-11 | Stop reason: HOSPADM

## 2024-01-10 RX ORDER — NALOXONE HYDROCHLORIDE 0.4 MG/ML
0.2 INJECTION, SOLUTION INTRAMUSCULAR; INTRAVENOUS; SUBCUTANEOUS
Status: DISCONTINUED | OUTPATIENT
Start: 2024-01-10 | End: 2024-01-11 | Stop reason: HOSPADM

## 2024-01-10 RX ORDER — FENTANYL CITRATE 50 UG/ML
INJECTION, SOLUTION INTRAMUSCULAR; INTRAVENOUS PRN
Status: DISCONTINUED | OUTPATIENT
Start: 2024-01-10 | End: 2024-01-10

## 2024-01-10 RX ORDER — ACETAMINOPHEN 500 MG
500-1000 TABLET ORAL EVERY 6 HOURS PRN
Status: ON HOLD | COMMUNITY
End: 2024-01-11

## 2024-01-10 RX ORDER — CEFAZOLIN SODIUM/WATER 2 G/20 ML
2 SYRINGE (ML) INTRAVENOUS SEE ADMIN INSTRUCTIONS
Status: DISCONTINUED | OUTPATIENT
Start: 2024-01-10 | End: 2024-01-10 | Stop reason: HOSPADM

## 2024-01-10 RX ORDER — ACETAMINOPHEN 325 MG/1
975 TABLET ORAL EVERY 8 HOURS
Status: DISCONTINUED | OUTPATIENT
Start: 2024-01-10 | End: 2024-01-11 | Stop reason: HOSPADM

## 2024-01-10 RX ORDER — FENTANYL CITRATE 50 UG/ML
50 INJECTION, SOLUTION INTRAMUSCULAR; INTRAVENOUS EVERY 5 MIN PRN
Status: DISCONTINUED | OUTPATIENT
Start: 2024-01-10 | End: 2024-01-10 | Stop reason: HOSPADM

## 2024-01-10 RX ORDER — LIDOCAINE 40 MG/G
CREAM TOPICAL
Status: DISCONTINUED | OUTPATIENT
Start: 2024-01-10 | End: 2024-01-11 | Stop reason: HOSPADM

## 2024-01-10 RX ORDER — DEXAMETHASONE SODIUM PHOSPHATE 4 MG/ML
INJECTION, SOLUTION INTRA-ARTICULAR; INTRALESIONAL; INTRAMUSCULAR; INTRAVENOUS; SOFT TISSUE PRN
Status: DISCONTINUED | OUTPATIENT
Start: 2024-01-10 | End: 2024-01-10

## 2024-01-10 RX ORDER — ACETAMINOPHEN 325 MG/1
650 TABLET ORAL EVERY 4 HOURS PRN
Status: DISCONTINUED | OUTPATIENT
Start: 2024-01-13 | End: 2024-01-11 | Stop reason: HOSPADM

## 2024-01-10 RX ORDER — ASPIRIN 325 MG
325 TABLET, DELAYED RELEASE (ENTERIC COATED) ORAL DAILY
Qty: 30 TABLET | Refills: 0 | Status: SHIPPED | OUTPATIENT
Start: 2024-01-10 | End: 2024-09-19

## 2024-01-10 RX ORDER — ASPIRIN 325 MG
325 TABLET, DELAYED RELEASE (ENTERIC COATED) ORAL DAILY
Status: DISCONTINUED | OUTPATIENT
Start: 2024-01-11 | End: 2024-01-11 | Stop reason: HOSPADM

## 2024-01-10 RX ORDER — LIDOCAINE HYDROCHLORIDE 20 MG/ML
INJECTION, SOLUTION INFILTRATION; PERINEURAL PRN
Status: DISCONTINUED | OUTPATIENT
Start: 2024-01-10 | End: 2024-01-10

## 2024-01-10 RX ORDER — POLYETHYLENE GLYCOL 3350 17 G/17G
17 POWDER, FOR SOLUTION ORAL DAILY
Status: DISCONTINUED | OUTPATIENT
Start: 2024-01-11 | End: 2024-01-11 | Stop reason: HOSPADM

## 2024-01-10 RX ORDER — CEFAZOLIN SODIUM/WATER 2 G/20 ML
2 SYRINGE (ML) INTRAVENOUS
Status: COMPLETED | OUTPATIENT
Start: 2024-01-10 | End: 2024-01-10

## 2024-01-10 RX ORDER — CEFAZOLIN SODIUM 2 G/100ML
2 INJECTION, SOLUTION INTRAVENOUS EVERY 8 HOURS
Qty: 200 ML | Refills: 0 | Status: COMPLETED | OUTPATIENT
Start: 2024-01-10 | End: 2024-01-11

## 2024-01-10 RX ORDER — BISACODYL 10 MG
10 SUPPOSITORY, RECTAL RECTAL DAILY PRN
Status: DISCONTINUED | OUTPATIENT
Start: 2024-01-10 | End: 2024-01-11 | Stop reason: HOSPADM

## 2024-01-10 RX ORDER — ACETAMINOPHEN 325 MG/1
975 TABLET ORAL ONCE
Status: DISCONTINUED | OUTPATIENT
Start: 2024-01-10 | End: 2024-01-10

## 2024-01-10 RX ORDER — KETAMINE HYDROCHLORIDE 10 MG/ML
INJECTION INTRAMUSCULAR; INTRAVENOUS PRN
Status: DISCONTINUED | OUTPATIENT
Start: 2024-01-10 | End: 2024-01-10

## 2024-01-10 RX ORDER — ACETAMINOPHEN 325 MG/1
975 TABLET ORAL ONCE
Status: DISCONTINUED | OUTPATIENT
Start: 2024-01-10 | End: 2024-01-10 | Stop reason: ALTCHOICE

## 2024-01-10 RX ORDER — HYDROMORPHONE HCL IN WATER/PF 6 MG/30 ML
0.2 PATIENT CONTROLLED ANALGESIA SYRINGE INTRAVENOUS
Status: DISCONTINUED | OUTPATIENT
Start: 2024-01-10 | End: 2024-01-11 | Stop reason: HOSPADM

## 2024-01-10 RX ORDER — AMOXICILLIN 250 MG
1 CAPSULE ORAL 2 TIMES DAILY
Status: DISCONTINUED | OUTPATIENT
Start: 2024-01-10 | End: 2024-01-11 | Stop reason: HOSPADM

## 2024-01-10 RX ORDER — HYDROXYZINE HYDROCHLORIDE 25 MG/1
25 TABLET, FILM COATED ORAL EVERY 6 HOURS PRN
Status: DISCONTINUED | OUTPATIENT
Start: 2024-01-10 | End: 2024-01-10 | Stop reason: HOSPADM

## 2024-01-10 RX ORDER — MAGNESIUM SULFATE HEPTAHYDRATE 40 MG/ML
INJECTION, SOLUTION INTRAVENOUS PRN
Status: DISCONTINUED | OUTPATIENT
Start: 2024-01-10 | End: 2024-01-10

## 2024-01-10 RX ORDER — PROPOFOL 10 MG/ML
INJECTION, EMULSION INTRAVENOUS CONTINUOUS PRN
Status: DISCONTINUED | OUTPATIENT
Start: 2024-01-10 | End: 2024-01-10

## 2024-01-10 RX ORDER — ONDANSETRON 2 MG/ML
INJECTION INTRAMUSCULAR; INTRAVENOUS PRN
Status: DISCONTINUED | OUTPATIENT
Start: 2024-01-10 | End: 2024-01-10

## 2024-01-10 RX ORDER — GLYCOPYRROLATE 0.2 MG/ML
INJECTION, SOLUTION INTRAMUSCULAR; INTRAVENOUS PRN
Status: DISCONTINUED | OUTPATIENT
Start: 2024-01-10 | End: 2024-01-10

## 2024-01-10 RX ORDER — ACETAMINOPHEN 325 MG/1
650 TABLET ORAL EVERY 4 HOURS PRN
Qty: 100 TABLET | Refills: 0 | Status: SHIPPED | OUTPATIENT
Start: 2024-01-10

## 2024-01-10 RX ORDER — HYDROMORPHONE HCL IN WATER/PF 6 MG/30 ML
0.4 PATIENT CONTROLLED ANALGESIA SYRINGE INTRAVENOUS
Status: DISCONTINUED | OUTPATIENT
Start: 2024-01-10 | End: 2024-01-11 | Stop reason: HOSPADM

## 2024-01-10 RX ORDER — BUPIVACAINE HYDROCHLORIDE 7.5 MG/ML
INJECTION, SOLUTION INTRASPINAL PRN
Status: DISCONTINUED | OUTPATIENT
Start: 2024-01-10 | End: 2024-01-10

## 2024-01-10 RX ORDER — OXYCODONE HYDROCHLORIDE 5 MG/1
5 TABLET ORAL EVERY 4 HOURS PRN
Status: DISCONTINUED | OUTPATIENT
Start: 2024-01-10 | End: 2024-01-11 | Stop reason: HOSPADM

## 2024-01-10 RX ORDER — NALOXONE HYDROCHLORIDE 0.4 MG/ML
0.4 INJECTION, SOLUTION INTRAMUSCULAR; INTRAVENOUS; SUBCUTANEOUS
Status: DISCONTINUED | OUTPATIENT
Start: 2024-01-10 | End: 2024-01-11 | Stop reason: HOSPADM

## 2024-01-10 RX ORDER — HYDROXYZINE HYDROCHLORIDE 50 MG/1
50 TABLET, FILM COATED ORAL EVERY 6 HOURS PRN
Status: DISCONTINUED | OUTPATIENT
Start: 2024-01-10 | End: 2024-01-10 | Stop reason: HOSPADM

## 2024-01-10 RX ORDER — HYDROMORPHONE HCL IN WATER/PF 6 MG/30 ML
0.4 PATIENT CONTROLLED ANALGESIA SYRINGE INTRAVENOUS EVERY 5 MIN PRN
Status: DISCONTINUED | OUTPATIENT
Start: 2024-01-10 | End: 2024-01-10 | Stop reason: HOSPADM

## 2024-01-10 RX ORDER — KETOROLAC TROMETHAMINE 15 MG/ML
15 INJECTION, SOLUTION INTRAMUSCULAR; INTRAVENOUS EVERY 6 HOURS
Qty: 4 ML | Refills: 0 | Status: DISCONTINUED | OUTPATIENT
Start: 2024-01-10 | End: 2024-01-11 | Stop reason: HOSPADM

## 2024-01-10 RX ORDER — IBUPROFEN 600 MG/1
600 TABLET, FILM COATED ORAL EVERY 8 HOURS PRN
Qty: 30 TABLET | Refills: 0 | Status: SHIPPED | OUTPATIENT
Start: 2024-01-10

## 2024-01-10 RX ORDER — ONDANSETRON 2 MG/ML
4 INJECTION INTRAMUSCULAR; INTRAVENOUS EVERY 6 HOURS PRN
Status: DISCONTINUED | OUTPATIENT
Start: 2024-01-10 | End: 2024-01-11 | Stop reason: HOSPADM

## 2024-01-10 RX ORDER — KETOROLAC TROMETHAMINE 30 MG/ML
INJECTION, SOLUTION INTRAMUSCULAR; INTRAVENOUS PRN
Status: DISCONTINUED | OUTPATIENT
Start: 2024-01-10 | End: 2024-01-10

## 2024-01-10 RX ORDER — GABAPENTIN 300 MG/1
300 CAPSULE ORAL
Status: COMPLETED | OUTPATIENT
Start: 2024-01-10 | End: 2024-01-10

## 2024-01-10 RX ORDER — AMOXICILLIN 250 MG
1-2 CAPSULE ORAL 2 TIMES DAILY
Qty: 30 TABLET | Refills: 0 | Status: SHIPPED | OUTPATIENT
Start: 2024-01-10 | End: 2024-09-19

## 2024-01-10 RX ORDER — PROCHLORPERAZINE MALEATE 10 MG
10 TABLET ORAL EVERY 6 HOURS PRN
Status: DISCONTINUED | OUTPATIENT
Start: 2024-01-10 | End: 2024-01-11 | Stop reason: HOSPADM

## 2024-01-10 RX ORDER — ONDANSETRON 4 MG/1
4 TABLET, ORALLY DISINTEGRATING ORAL EVERY 6 HOURS PRN
Status: DISCONTINUED | OUTPATIENT
Start: 2024-01-10 | End: 2024-01-11 | Stop reason: HOSPADM

## 2024-01-10 RX ORDER — OXYCODONE HYDROCHLORIDE 5 MG/1
5-10 TABLET ORAL EVERY 4 HOURS PRN
Qty: 20 TABLET | Refills: 0 | Status: SHIPPED | OUTPATIENT
Start: 2024-01-10 | End: 2024-09-19

## 2024-01-10 RX ORDER — ALBUTEROL SULFATE 90 UG/1
2 AEROSOL, METERED RESPIRATORY (INHALATION) EVERY 4 HOURS PRN
Status: DISCONTINUED | OUTPATIENT
Start: 2024-01-10 | End: 2024-01-11 | Stop reason: HOSPADM

## 2024-01-10 RX ORDER — ACETAMINOPHEN 325 MG/1
975 TABLET ORAL ONCE
Status: DISCONTINUED | OUTPATIENT
Start: 2024-01-10 | End: 2024-01-10 | Stop reason: HOSPADM

## 2024-01-10 RX ORDER — ONDANSETRON 2 MG/ML
4 INJECTION INTRAMUSCULAR; INTRAVENOUS EVERY 30 MIN PRN
Status: DISCONTINUED | OUTPATIENT
Start: 2024-01-10 | End: 2024-01-10 | Stop reason: HOSPADM

## 2024-01-10 RX ORDER — FENTANYL CITRATE 50 UG/ML
50 INJECTION, SOLUTION INTRAMUSCULAR; INTRAVENOUS ONCE
Status: COMPLETED | OUTPATIENT
Start: 2024-01-10 | End: 2024-01-10

## 2024-01-10 RX ADMIN — PHENYLEPHRINE HYDROCHLORIDE 50 MCG: 10 INJECTION INTRAVENOUS at 13:44

## 2024-01-10 RX ADMIN — DEXAMETHASONE SODIUM PHOSPHATE 10 MG: 4 INJECTION, SOLUTION INTRA-ARTICULAR; INTRALESIONAL; INTRAMUSCULAR; INTRAVENOUS; SOFT TISSUE at 12:00

## 2024-01-10 RX ADMIN — ONDANSETRON 4 MG: 2 INJECTION INTRAMUSCULAR; INTRAVENOUS at 11:49

## 2024-01-10 RX ADMIN — SODIUM CHLORIDE, POTASSIUM CHLORIDE, SODIUM LACTATE AND CALCIUM CHLORIDE 1000 ML: 600; 310; 30; 20 INJECTION, SOLUTION INTRAVENOUS at 11:00

## 2024-01-10 RX ADMIN — PHENYLEPHRINE HYDROCHLORIDE 100 MCG: 10 INJECTION INTRAVENOUS at 12:32

## 2024-01-10 RX ADMIN — GABAPENTIN 300 MG: 300 CAPSULE ORAL at 11:13

## 2024-01-10 RX ADMIN — SODIUM CHLORIDE, POTASSIUM CHLORIDE, SODIUM LACTATE AND CALCIUM CHLORIDE: 600; 310; 30; 20 INJECTION, SOLUTION INTRAVENOUS at 16:22

## 2024-01-10 RX ADMIN — SODIUM CHLORIDE, POTASSIUM CHLORIDE, SODIUM LACTATE AND CALCIUM CHLORIDE: 600; 310; 30; 20 INJECTION, SOLUTION INTRAVENOUS at 21:47

## 2024-01-10 RX ADMIN — PROPOFOL 100 MCG/KG/MIN: 10 INJECTION, EMULSION INTRAVENOUS at 12:00

## 2024-01-10 RX ADMIN — ACETAMINOPHEN 975 MG: 325 TABLET, FILM COATED ORAL at 17:03

## 2024-01-10 RX ADMIN — PHENYLEPHRINE HYDROCHLORIDE 50 MCG: 10 INJECTION INTRAVENOUS at 14:02

## 2024-01-10 RX ADMIN — SODIUM CHLORIDE, POTASSIUM CHLORIDE, SODIUM LACTATE AND CALCIUM CHLORIDE: 600; 310; 30; 20 INJECTION, SOLUTION INTRAVENOUS at 13:01

## 2024-01-10 RX ADMIN — KETOROLAC TROMETHAMINE 15 MG: 15 INJECTION, SOLUTION INTRAMUSCULAR; INTRAVENOUS at 23:54

## 2024-01-10 RX ADMIN — Medication 2 G: at 11:43

## 2024-01-10 RX ADMIN — PHENYLEPHRINE HYDROCHLORIDE 100 MCG: 10 INJECTION INTRAVENOUS at 13:40

## 2024-01-10 RX ADMIN — PHENYLEPHRINE HYDROCHLORIDE 100 MCG: 10 INJECTION INTRAVENOUS at 12:40

## 2024-01-10 RX ADMIN — KETAMINE HYDROCHLORIDE 20 MG: 10 INJECTION INTRAMUSCULAR; INTRAVENOUS at 11:53

## 2024-01-10 RX ADMIN — PHENYLEPHRINE HYDROCHLORIDE 50 MCG: 10 INJECTION INTRAVENOUS at 13:46

## 2024-01-10 RX ADMIN — FENTANYL CITRATE 50 MCG: 50 INJECTION INTRAMUSCULAR; INTRAVENOUS at 11:17

## 2024-01-10 RX ADMIN — SENNOSIDES AND DOCUSATE SODIUM 1 TABLET: 8.6; 5 TABLET ORAL at 20:39

## 2024-01-10 RX ADMIN — LIDOCAINE HYDROCHLORIDE 100 MG: 20 INJECTION, SOLUTION INFILTRATION; PERINEURAL at 12:00

## 2024-01-10 RX ADMIN — KETAMINE HYDROCHLORIDE 10 MG: 10 INJECTION INTRAMUSCULAR; INTRAVENOUS at 14:01

## 2024-01-10 RX ADMIN — LIDOCAINE HYDROCHLORIDE 0.1 ML: 10 INJECTION, SOLUTION EPIDURAL; INFILTRATION; INTRACAUDAL; PERINEURAL at 11:01

## 2024-01-10 RX ADMIN — PHENYLEPHRINE HYDROCHLORIDE 50 MCG: 10 INJECTION INTRAVENOUS at 13:55

## 2024-01-10 RX ADMIN — MAGNESIUM SULFATE HEPTAHYDRATE 2 G: 40 INJECTION, SOLUTION INTRAVENOUS at 12:00

## 2024-01-10 RX ADMIN — CEFAZOLIN SODIUM 2 G: 2 INJECTION, SOLUTION INTRAVENOUS at 20:39

## 2024-01-10 RX ADMIN — KETOROLAC TROMETHAMINE 15 MG: 15 INJECTION, SOLUTION INTRAMUSCULAR; INTRAVENOUS at 18:22

## 2024-01-10 RX ADMIN — FENTANYL CITRATE 100 MCG: 50 INJECTION INTRAMUSCULAR; INTRAVENOUS at 11:49

## 2024-01-10 RX ADMIN — PHENYLEPHRINE HYDROCHLORIDE 50 MCG: 10 INJECTION INTRAVENOUS at 13:52

## 2024-01-10 RX ADMIN — KETOROLAC TROMETHAMINE 15 MG: 30 INJECTION, SOLUTION INTRAMUSCULAR at 12:00

## 2024-01-10 RX ADMIN — PHENYLEPHRINE HYDROCHLORIDE 50 MCG: 10 INJECTION INTRAVENOUS at 13:49

## 2024-01-10 RX ADMIN — KETAMINE HYDROCHLORIDE 10 MG: 10 INJECTION INTRAMUSCULAR; INTRAVENOUS at 12:30

## 2024-01-10 RX ADMIN — BUPIVACAINE HYDROCHLORIDE IN DEXTROSE 1.5 ML: 7.5 INJECTION, SOLUTION SUBARACHNOID at 11:58

## 2024-01-10 RX ADMIN — OXYCODONE HYDROCHLORIDE 5 MG: 5 TABLET ORAL at 19:13

## 2024-01-10 RX ADMIN — MIDAZOLAM 2 MG: 1 INJECTION INTRAMUSCULAR; INTRAVENOUS at 11:49

## 2024-01-10 RX ADMIN — KETAMINE HYDROCHLORIDE 10 MG: 10 INJECTION INTRAMUSCULAR; INTRAVENOUS at 13:10

## 2024-01-10 RX ADMIN — GLYCOPYRROLATE 0.1 MG: 0.2 INJECTION, SOLUTION INTRAMUSCULAR; INTRAVENOUS at 11:49

## 2024-01-10 RX ADMIN — PHENYLEPHRINE HYDROCHLORIDE 50 MCG: 10 INJECTION INTRAVENOUS at 14:00

## 2024-01-10 RX ADMIN — ACETAMINOPHEN 975 MG: 325 TABLET, FILM COATED ORAL at 23:53

## 2024-01-10 RX ADMIN — PHENYLEPHRINE HYDROCHLORIDE 50 MCG: 10 INJECTION INTRAVENOUS at 13:58

## 2024-01-10 ASSESSMENT — ACTIVITIES OF DAILY LIVING (ADL)
ADLS_ACUITY_SCORE: 20
ADLS_ACUITY_SCORE: 37
ADLS_ACUITY_SCORE: 20
ADLS_ACUITY_SCORE: 35
ADLS_ACUITY_SCORE: 37

## 2024-01-10 NOTE — H&P
[facility] H&P    H&P  - [facility]    Otf Beck,  1995, MRN [id number]     Admitting Dx: Tibia/fibula fracture [S82.209A, S82.409A]     PCP: Emerald Newman, [phone]     Code status:  Full     Extended Emergency Contact Information  Primary Emergency Contact: Kieran Casey  Mobile Phone: [phone]  Relation: Spouse     Assessment:  Otf is a pleasant 28-year-old female with right spiral distal third tibial shaft fracture with posterior malleus fracture, distal fibula fracture.  I discussed risk and benefits of surgery with her today in detail including infection, damage to nerves and surrounding structures, nonunion, malunion, DVT, PE.  After this discussion, she elected to proceed with surgery.  Will plan for 1 night stay in the hospital.  Will plan for aspirin 325 mg once daily for DVT prophylaxis postoperatively.    Plan:  Right suprapatellar tibial nail, percutaneous fixation of posterior malleolus fracture, open reduction internal fixation of distal fibula fracture    Active Problems:    * No active hospital problems. *       Chief Complaint  Right ankle pain     HPI  Otf is a pleasant 28-year-old female who injured her right ankle while skating.  She was seen in our orthopedic urgent care in [place] and referred to me for surgical discussion.  Pain is localized to her distal tibia and ankle.  This has been managed without narcotic pain medications since the injury.  She denies numbness and tingling in her right lower extremity.  No other areas of pain.     History is obtained from the patient     Past Medical History  Past Medical History:   Diagnosis Date    Asthma     Clotting disorder (H)     Peptic ulceration        Surgical History  No past surgical history on file.     Social History  Social History     Socioeconomic History    Marital status:      Spouse name: Not on file    Number of children: 0    Years of education: Not on file     Highest education level: Not on file   Occupational History    Not on file   Tobacco Use    Smoking status: Never    Smokeless tobacco: Never   Substance and Sexual Activity    Alcohol use: Yes     Alcohol/week: 2.0 standard drinks of alcohol    Drug use: No    Sexual activity: Never   Other Topics Concern    Not on file   Social History Narrative    Not on file     Social Determinants of Health     Financial Resource Strain: Not on file   Food Insecurity: Not on file   Transportation Needs: Not on file   Physical Activity: Not on file   Stress: Not on file   Social Connections: Not on file   Interpersonal Safety: Not on file   Housing Stability: Not on file       Family History  Family History   Problem Relation Age of Onset    Cancer Paternal Grandfather 74.00        mesothelioma    No Known Problems Mother     No Known Problems Father         Allergies:  Amoxicillin, Sulfa (sulfonamide antibiotics) [sulfa antibiotics], and Sulfamethoxazole-trimethoprim [sulfamethoxazole-trimethoprim]      Current Medications:  Current Facility-Administered Medications   Medication    acetaminophen (TYLENOL) tablet 975 mg    ceFAZolin Sodium (ANCEF) injection 2 g    ceFAZolin Sodium (ANCEF) injection 2 g    lactated ringers infusion    lidocaine (LMX4) kit    lidocaine 1 % 0.1-1 mL    sodium chloride (PF) 0.9% PF flush 3 mL    sodium chloride (PF) 0.9% PF flush 3 mL       Review of Systems:  The Review of Systems is negative other than noted in the HPI    Physical Exam:  Temp:  [99.1  F (37.3  C)] 99.1  F (37.3  C)  Pulse:  [67] 67  Resp:  [12] 12  BP: (123)/(70) 123/70  SpO2:  [100 %] 100 %  Heart: Regular rate and rhythm  Lungs: Clear to auscultation bilaterally  Right lower extremity: Splinted, able to flex and extend toes.  Normal sensation to superficial peroneal, deep peroneal, tibial nerve distributions.     Pertinent Labs  Lab Results: personally reviewed.  Lab Results   Component Value Date    WBC 6.2 01/10/2024    HGB  "12.2 01/10/2024    HCT 36.6 01/10/2024    MCV 88 01/10/2024     01/10/2024     No results for input(s): \"INR\" in the last 168 hours.    Pertinent Radiology  X-rays of the right tib-fib obtained in January 9 at [facility] reviewed and interpreted.  This shows a spiral third distal tibial shaft fracture.  There is associated posterior malleolus fracture.  There is a distal fibula fracture.  CT scan of the right ankle from January 9 at [facility] reviewed and interpreted.  This redemonstrates posterior malleus fracture and a distal fibula fracture.  No displacement of the posterior malleolus fracture.    Attestation:  I have reviewed today's vital signs, notes, medications, labs and imaging.     Austyn Carolina MD    "

## 2024-01-10 NOTE — ANESTHESIA PREPROCEDURE EVALUATION
Anesthesia Pre-Procedure Evaluation    Patient: Otf Beck   MRN: [id number] : 1995        Procedure : Procedure(s):  Right tibial nail, open reduction internal fixation distal fibula          Past Medical History:   Diagnosis Date    Asthma     Clotting disorder (H)     Peptic ulceration       No past surgical history on file.   Allergies   Allergen Reactions    Amoxicillin Rash    Sulfa (Sulfonamide Antibiotics) [Sulfa Antibiotics] Hives and Rash    Sulfamethoxazole-Trimethoprim [Sulfamethoxazole-Trimethoprim] Rash      Social History     Tobacco Use    Smoking status: Never    Smokeless tobacco: Never   Substance Use Topics    Alcohol use: Yes     Alcohol/week: 2.0 standard drinks of alcohol      Wt Readings from Last 1 Encounters:   10/27/22 66.2 kg (145 lb 14.4 oz)        Anesthesia Evaluation            ROS/MED HX  ENT/Pulmonary:     (+)           allergic rhinitis,          Intermittent, asthma  Treatment: Inhaler prn,                 Neurologic:     (+)          CVA,  without deficits,  TIA (Occurred at age 13, all tests negative), date: ,                 Cardiovascular:  - neg cardiovascular ROS     METS/Exercise Tolerance:     Hematologic:     (+) History of blood clots,    pt is not anticoagulated,           Musculoskeletal:   (+)     fracture, Fracture location: RLE,         GI/Hepatic: Comment: PUD        Renal/Genitourinary:  - neg Renal ROS     Endo:       Psychiatric/Substance Use:  - neg psychiatric ROS     Infectious Disease:  - neg infectious disease ROS     Malignancy:  - neg malignancy ROS     Other:  - neg other ROS          Physical Exam    Airway        Mallampati: I   TM distance: > 3 FB   Neck ROM: full   Mouth opening: > 3 cm    Respiratory Devices and Support         Dental           Cardiovascular   cardiovascular exam normal          Pulmonary   pulmonary exam normal            OUTSIDE LABS:  CBC:   Lab Results   Component Value Date    WBC 5.3 2021    WBC 5.4  "12/29/2020    HGB 12.0 06/14/2021    HGB 14.0 12/29/2020    HCT 34.9 (L) 06/14/2021    HCT 42.1 12/29/2020     06/14/2021     12/29/2020     BMP:   Lab Results   Component Value Date     09/07/2021     05/20/2020    POTASSIUM 4.6 09/07/2021    POTASSIUM 4.5 05/20/2020    CHLORIDE 107 09/07/2021    CHLORIDE 105 05/20/2020    CO2 25 09/07/2021    CO2 27 05/20/2020    BUN 13 09/07/2021    BUN 10 05/20/2020    CR 0.87 09/07/2021    CR 0.79 06/14/2021    GLC 72 09/07/2021    GLC 83 05/20/2020     COAGS:   Lab Results   Component Value Date    PTT 34 12/29/2020    INR 0.97 12/29/2020     POC: No results found for: \"BGM\", \"HCG\", \"HCGS\"  HEPATIC:   Lab Results   Component Value Date    ALBUMIN 4.2 09/07/2021    PROTTOTAL 7.6 09/07/2021    ALT 15 09/07/2021    AST 18 09/07/2021    ALKPHOS 47 09/07/2021    BILITOTAL 0.7 09/07/2021     OTHER:   Lab Results   Component Value Date    A1C 5.0 02/13/2020    HAILEY 9.6 09/07/2021    MAG 1.9 09/07/2021    LIPASE 29 02/13/2020    TSH 0.55 06/14/2021    SED 6 06/14/2021       Anesthesia Plan    ASA Status:  2       Anesthesia Type: General.     - Airway: ETT   Induction: Propofol.   Maintenance: TIVA.        Consents    Anesthesia Plan(s) and associated risks, benefits, and realistic alternatives discussed. Questions answered and patient/representative(s) expressed understanding.     - Discussed: Risks, Benefits and Alternatives for BOTH SEDATION and the PROCEDURE were discussed     - Discussed with:  Patient            Postoperative Care    Pain management: IV analgesics, Oral pain medications, Multi-modal analgesia.   PONV prophylaxis: Ondansetron (or other 5HT-3), Dexamethasone or Solumedrol     Comments:               FRANCOISE Radford CRNA    I have reviewed the pertinent notes and labs in the chart from the past 30 days and (re)examined the patient.  Any updates or changes from those notes are reflected in this note.                  "

## 2024-01-10 NOTE — OP NOTE
NAME:  Otf Beck  MRN: [id number]  :  1995    OPERATION DATE: 1/10/2024    PREOPERATIVE DIAGNOSIS:  1.  Right closed distal one third spiral tibial shaft fracture.  2.  Right closed distal fibula fracture  3.  Right closed posterior malleolus fracture    POSTOPERATIVE DIAGNOSIS:  1.  Right closed distal one third spiral tibial shaft fracture.  2.  Right closed distal fibula fracture  3.  Right closed posterior malleolus fracture    OPERATION:  1.  Open treatment with intramedullary fixation of right distal one third tibial shaft fracture.  2.  Percutaneous reduction with percutaneous screw fixation of posterior malleolus fracture  3.  Open reduction and internal fixation of right distal fibula fracture    ANESTHESIA:  Spinal with sedation    ESTIMATED BLOOD LOSS:  100 mL.    PREOPERATIVE ANTIBIOTICS:  Ancef intravenously.    SPECIMENS:  None.    COMPLICATIONS:  None.    IMPLANTS:  Synthes advanced suprapatellar tibial nail, 9 mm x 315mm with 2 proximal and 2 distal interlocking screws  4.0 mm partially-threaded cannulated Synthes screw  Synthes 7-hole one third tubular plate with 3.5mm cortical screws proximally, 4.0mm cancellous screws distal, and one 2.7 mm cortical lag screw    HISTORY:  Otf Beck is a 28 year old female who suffered a right distal third tibial shaft fracture with posterior malleolus fracture and distal fibula fracture while skating.  I discussed the nature of her injury and the different treatment options, both operative and non operative, and the rationale behind our recommendation for surgical treatment. She understands the risks inherent to the injury as well as the risks inherent to the procedure and wishes to proceed. Informed consent was obtained and placed in the chart.    DETAILS OF THE PROCEDURE:  Otf Beck was taken to the operating room, where a timeout was performed. Spinal anesthesia was induced. Intravenous antibiotics were administered. She  was positioned supine on the operating room table, with all pressure points appropriately padded. The right lower extremity was prepped and draped in the usual sterile fashion and a final timeout was performed.    I brought in fluoroscopy for percutaneous fixation of her nondisplaced posterior malleolus fracture.  I placed a large pointed reduction clamp with small stab incisions anterior over the tibia and posterior over the tibia to hold this in place.  I then used a K wire to cross the fracture site and confirmed appropriate position on fluoroscopy.  I measured this with the percutaneous guide and used the drill.  I then placed a 4.0 mm partially-threaded cannulated screw for fixation of the posterior malleolus fracture.     I then turned my attention to the distal third tibial shaft fracture.  I made 2 small stab incisions and placed a large pointed reduction clamp over the fracture.  I pulled traction and clamped across the fracture site.  It reduced quite nicely under fluoroscopy on the AP and lateral views.  I tightened the clamp.  I then made a 6 cm incision superior to the patella.  I sharply incised the quadriceps tendon and set the insertion guide into her knee for a suprapatellar nail.  I placed the guidepin under fluoroscopic guidance making sure it was in appropriate position on the AP and lateral views.  I then used the opening drill and placed a ball-tipped guidewire.  I sequentially reamed to 10.5 mm and used the measuring device for the length of the nail.  I then inserted a 9 mm x 315 mm Synthes tibial nail.  I placed 2 distal interlocking screws and lightly back slapped the insertion handle.  I then placed 2 proximal interlocking screws.  The insertion handle was removed and fluoroscopy confirmed an excellent reduction of the fracture with appropriate screw lengths.    I then made a standard lateral approach to the fibula and expose the fracture site.  I used a small pointed reduction clamp to  reduce it.  I then placed a 2.7 mm lag screw across the fracture and to hold it in place.  I picked out a 7 hole Synthes one third tubular plate and placed three 3.5 cortical screws proximal to the fracture and two 4.0 mm cancellous screws distal to the fracture.  At this point, I took final AP and lateral fluoroscopy of the ankle and knee joint.  The fracture was aligned very well and her fibula was anatomically reduced.    We then irrigated out all of her incisions.  The knee incision was closed with 0-Vicryl for the quadriceps tendon.  2-0 Vicryl for the dermal layer, and 3-0 nylon on the skin.  The percutaneous incisions were closed with 3-0 nylon.  The lateral incision for her distal fibula was closed with 0 Vicryl for the deep layer, 2-0 Vicryl for the dermal layer, and 3-0 nylon for the skin.  Sterile dressings were placed and she was placed into a short leg splint.    She was awakened, transferred to the [facility] and taken to the post-anesthesia care unit in good condition. She tolerated the procedure well and there were no complications.     POSTOPERATIVE PLAN:  - Activity: Nonweightbearing to right lower extremity, elevate at all times.  - Antibiotics: Routine perioperative prophylactic antibiotics x 24 hours  - DVT prophylaxis: Mechanical; Aspirin 325mg for 1 month  - Maintain splint until 2 week post operative follow up, keep clean and dry  - Follow up in clinic in 2 weeks for wound check    ATTENDING SURGEON:  Austyn Carolina MD    ASSISTANT(S):  SRAVAN He    A PA assist was required for this procedure for adequate retraction, placement of implants, and closure.

## 2024-01-10 NOTE — ANESTHESIA PROCEDURE NOTES
"Intrathecal injection Procedure Note    Pre-Procedure   Staff -        CRNA: Alexandra Hooper APRN CRNA       Performed By: CRNA       Location: OR       Pre-Anesthestic Checklist: patient identified, IV checked, risks and benefits discussed, informed consent, monitors and equipment checked, pre-op evaluation, at physician/surgeon's request and post-op pain management  Timeout:       Correct Patient: Yes        Correct Procedure: Yes        Correct Site: Yes        Correct Position: Yes   Procedure Documentation  Procedure: intrathecal injection       Patient Position: sitting       Patient Prep/Sterile Barriers: sterile gloves, mask, patient draped       Skin prep: Betadine       Insertion Site: L3-4. (midline approach).       Needle Gauge: 25.        Needle Length (Inches): 3.5        Spinal Needle Type: Pencan       Introducer used       Introducer: 20 G       # of attempts: 1 and  # of redirects:  0    Assessment/Narrative         Paresthesias: No.       CSF fluid: clear.       Opening pressure was cmH2O while  Sitting.        FOR [facility] ([facility]) ONLY:   Pain Team Contact information: please page the Pain Team Via Readmill. Search \"Pain\". During daytime hours, please page the attending first. At night please page the resident first.      "

## 2024-01-10 NOTE — ANESTHESIA POSTPROCEDURE EVALUATION
Patient: Otf Beck    Procedure: Procedure(s):  Right tibial nail, open reduction internal fixation distal fibula, open reduction internal fixation posterior malleolus       Anesthesia Type:  General    Note:  Disposition: Admission   Postop Pain Control: Uneventful            Sign Out: Well controlled pain   PONV: No   Neuro/Psych: Uneventful            Sign Out: Acceptable/Baseline neuro status   Airway/Respiratory: Uneventful            Sign Out: Acceptable/Baseline resp. status   CV/Hemodynamics: Uneventful            Sign Out: Acceptable CV status; No obvious hypovolemia; No obvious fluid overload   Other NRE: NONE   DID A NON-ROUTINE EVENT OCCUR? No           Last vitals:  Vitals Value Taken Time   /63 01/10/24 1500   Temp 37.2  C (98.9  F) 01/10/24 1448   Pulse 50 01/10/24 1508   Resp 9 01/10/24 1508   SpO2 98 % 01/10/24 1508   Vitals shown include unfiled device data.    Electronically Signed By: FRANCOISE Aguilar CRNA  January 10, 2024  3:09 PM

## 2024-01-10 NOTE — PROGRESS NOTES
"[facility] ADMISSION NOTE    Patient admitted to room 2308 at approximately 1600 via cart from surgery. Patient was accompanied by mother.     Verbal SBAR report received from Lyly JENKINS prior to patient arrival.     Patient trasferred to bed via air teresa. Patient alert and oriented X 3. Pain is controlled without any medications.  . Admission vital signs: Blood pressure 93/63, pulse 51, temperature 98.5  F (36.9  C), temperature source Temporal, resp. rate 10, height 1.632 m (5' 4.25\"), weight 65.8 kg (145 lb), last menstrual period 12/31/2023, SpO2 96%. Patient was oriented to plan of care, call light, bed controls, tv, telephone, bathroom, and visiting hours.     Risk Assessment    The following safety risks were identified during admission: fall. Yellow risk band applied: YES.     Skin Initial Assessment    This writer admitted this patient and completed a full skin assessment and [place] score in the Adult PCS flowsheet. Appropriate interventions initiated as needed.     Secondary skin check completed by Mariah JENKINS.         Education    Patient has a [place] to Observation order: No  Observation education completed and documented: N/A      Manuel Joseph RN      "

## 2024-01-10 NOTE — ANESTHESIA CARE TRANSFER NOTE
Patient: Otf Beck    Procedure: Procedure(s):  Right tibial nail, open reduction internal fixation distal fibula, open reduction internal fixation posterior malleolus       Diagnosis: Tibia/fibula fracture [S82.209A, S82.409A]  Diagnosis Additional Information: No value filed.    Anesthesia Type:   General     Note:    Oropharynx: oropharynx clear of all foreign objects  Level of Consciousness: awake  Oxygen Supplementation: face mask    Independent Airway: airway patency satisfactory and stable  Dentition: dentition unchanged  Vital Signs Stable: post-procedure vital signs reviewed and stable  Report to RN Given: handoff report given  Patient transferred to: PACU    Handoff Report: Identifed the Patient, Identified the Reponsible Provider, Reviewed the pertinent medical history, Discussed the surgical course, Reviewed Intra-OP anesthesia mangement and issues during anesthesia, Set expectations for post-procedure period and Allowed opportunity for questions and acknowledgement of understanding      Vitals:  Vitals Value Taken Time   /63 01/10/24 1448   Temp     Pulse 70 01/10/24 1450   Resp 16 01/10/24 1450   SpO2 98 % 01/10/24 1450   Vitals shown include unfiled device data.    Electronically Signed By: FRANCOISE Aguilar CRNA  January 10, 2024  2:52 PM

## 2024-01-11 ENCOUNTER — APPOINTMENT (OUTPATIENT)
Dept: PHYSICAL THERAPY | Facility: CLINIC | Age: 29
End: 2024-01-11
Attending: STUDENT IN AN ORGANIZED HEALTH CARE EDUCATION/TRAINING PROGRAM
Payer: COMMERCIAL

## 2024-01-11 VITALS
WEIGHT: 145 LBS | SYSTOLIC BLOOD PRESSURE: 107 MMHG | RESPIRATION RATE: 16 BRPM | HEART RATE: 71 BPM | TEMPERATURE: 98.9 F | DIASTOLIC BLOOD PRESSURE: 71 MMHG | OXYGEN SATURATION: 98 % | HEIGHT: 64 IN | BODY MASS INDEX: 24.75 KG/M2

## 2024-01-11 LAB — GLUCOSE BLDC GLUCOMTR-MCNC: 98 MG/DL (ref 70–99)

## 2024-01-11 PROCEDURE — 250N000011 HC RX IP 250 OP 636: Performed by: STUDENT IN AN ORGANIZED HEALTH CARE EDUCATION/TRAINING PROGRAM

## 2024-01-11 PROCEDURE — 97116 GAIT TRAINING THERAPY: CPT | Mod: GP

## 2024-01-11 PROCEDURE — 250N000013 HC RX MED GY IP 250 OP 250 PS 637: Performed by: STUDENT IN AN ORGANIZED HEALTH CARE EDUCATION/TRAINING PROGRAM

## 2024-01-11 PROCEDURE — 97530 THERAPEUTIC ACTIVITIES: CPT | Mod: GP

## 2024-01-11 PROCEDURE — 82962 GLUCOSE BLOOD TEST: CPT

## 2024-01-11 PROCEDURE — 99231 SBSQ HOSP IP/OBS SF/LOW 25: CPT | Performed by: PHYSICIAN ASSISTANT

## 2024-01-11 PROCEDURE — 250N000013 HC RX MED GY IP 250 OP 250 PS 637: Performed by: PHYSICIAN ASSISTANT

## 2024-01-11 PROCEDURE — 97161 PT EVAL LOW COMPLEX 20 MIN: CPT | Mod: GP

## 2024-01-11 RX ORDER — HYDROXYZINE HYDROCHLORIDE 50 MG/1
50 TABLET, FILM COATED ORAL EVERY 6 HOURS PRN
Status: DISCONTINUED | OUTPATIENT
Start: 2024-01-11 | End: 2024-01-11 | Stop reason: HOSPADM

## 2024-01-11 RX ORDER — HYDROXYZINE HYDROCHLORIDE 25 MG/1
25 TABLET, FILM COATED ORAL EVERY 6 HOURS PRN
Qty: 30 TABLET | Refills: 0 | Status: SHIPPED | OUTPATIENT
Start: 2024-01-11 | End: 2024-09-19

## 2024-01-11 RX ORDER — HYDROXYZINE HYDROCHLORIDE 25 MG/1
25 TABLET, FILM COATED ORAL EVERY 6 HOURS PRN
Status: DISCONTINUED | OUTPATIENT
Start: 2024-01-11 | End: 2024-01-11 | Stop reason: HOSPADM

## 2024-01-11 RX ADMIN — HYDROXYZINE HYDROCHLORIDE 25 MG: 25 TABLET, FILM COATED ORAL at 10:16

## 2024-01-11 RX ADMIN — POLYETHYLENE GLYCOL 3350 17 G: 17 POWDER, FOR SOLUTION ORAL at 07:54

## 2024-01-11 RX ADMIN — KETOROLAC TROMETHAMINE 15 MG: 15 INJECTION, SOLUTION INTRAMUSCULAR; INTRAVENOUS at 05:56

## 2024-01-11 RX ADMIN — CEFAZOLIN SODIUM 2 G: 2 INJECTION, SOLUTION INTRAVENOUS at 05:04

## 2024-01-11 RX ADMIN — SENNOSIDES AND DOCUSATE SODIUM 1 TABLET: 8.6; 5 TABLET ORAL at 07:54

## 2024-01-11 RX ADMIN — ACETAMINOPHEN 975 MG: 325 TABLET, FILM COATED ORAL at 07:54

## 2024-01-11 RX ADMIN — ASPIRIN 325 MG: 325 TABLET ORAL at 07:53

## 2024-01-11 ASSESSMENT — ACTIVITIES OF DAILY LIVING (ADL)
ADLS_ACUITY_SCORE: 19
ADLS_ACUITY_SCORE: 19
ADLS_ACUITY_SCORE: 20
ADLS_ACUITY_SCORE: 20
ADLS_ACUITY_SCORE: 19
ADLS_ACUITY_SCORE: 19

## 2024-01-11 NOTE — PROGRESS NOTES
01/11/24 0845   Appointment Info   Signing Clinician's Name / Credentials (PT) Lisette Lindsey, PT   Quick Adds   Quick Adds Certification   Living Environment   People in Home spouse   Current Living Arrangements house   Home Accessibility stairs to enter home;stairs within home   Number of Stairs, Main Entrance 2   Stair Railings, Main Entrance railings safe and in good condition   Number of Stairs, Within Home, Primary greater than 10 stairs  (flight of stairs upstairs but plans to stay on main level initially)   Stair Railings, Within Home, Primary railings safe and in good condition   Self-Care   Equipment Currently Used at Home crutches   Activity/Exercise/Self-Care Comment indep with mobility, ADL's, and IADL's. works as  instructor.   General Information   Onset of Illness/Injury or Date of Surgery 01/10/24   Referring Physician Austyn Carolina MD   Patient/Family Therapy Goals Statement (PT) return home   Pertinent History of Current Problem (include personal factors and/or comorbidities that impact the POC) 28 y.o. female admitted with R tibial shaft fracture, now s/p fixation with orders for NWB.   Weight-Bearing Status - RLE nonweight-bearing   Cognition   Affect/Mental Status (Cognition) WFL   Orientation Status (Cognition) oriented x 4   Follows Commands (Cognition) WFL   Pain Assessment   Patient Currently in Pain Yes, see Vital Sign flowsheet  (RLE, managed)   Range of Motion (ROM)   Range of Motion ROM deficits secondary to surgical procedure;ROM deficits secondary to pain   ROM Comment pt able to wiggle RLE toes and bend knee   Strength (Manual Muscle Testing)   Strength Comments strength limited by pain in RLE, functional on LLE   Bed Mobility   Comment, (Bed Mobility) pt sitting in chair so bed mobility not addressed but anticipate no concerns   Transfers   Comment, (Transfers) sit>stand from recliner with mod I and 2WW, good standing balance, maintains NWB RLE   Gait/Stairs  (Locomotion)   [place] Level (Gait) supervision   Assistive Device (Gait) walker, front-wheeled   Distance in Feet (Gait) 100   Pattern (Gait) swing-through   Deviations/Abnormal Patterns (Gait) gait speed decreased   Maintains Weight-bearing Status (Gait) able to maintain   Clinical Impression   Criteria for Skilled Therapeutic Intervention Yes, treatment indicated   PT Diagnosis (PT) impaired functional mobility   Influenced by the following impairments NWB RLE, pain   Functional limitations due to impairments impaired gait, stairs   Clinical Presentation (PT Evaluation Complexity) stable   Clinical Presentation Rationale clinical reasoning   Clinical Decision Making (Complexity) low complexity   Planned Therapy Interventions (PT) cryotherapy;gait training;home exercise program;patient/family education;ROM (range of motion);stair training;strengthening;transfer training;progressive activity/exercise;risk factor education;home program guidelines   Risk & Benefits of therapy have been explained evaluation/treatment results reviewed;care plan/treatment goals reviewed;risks/benefits reviewed;current/potential barriers reviewed;participants voiced agreement with care plan;participants included;patient   PT Total Evaluation Time   PT Eval, Low Complexity Minutes (49237) 10   Therapy Certification   Start of care date 01/11/24   Certification date from 01/11/24   Certification date to 01/11/24   Medical Diagnosis R distal tibia fracture   Physical Therapy Goals   PT Frequency One time eval and treatment only   PT Predicted Duration/Target Date for Goal Attainment 01/11/24   PT Goals Gait;Stairs;Transfers   PT: Transfers Modified independent;Sit to/from stand;Assistive device;Within precautions;Goal Met   PT: Gait Supervision/stand-by assist;Standard walker;100 feet;Within precautions;Goal Met   PT: Stairs Supervision/stand-by assist;2 stairs;Rail on right;Within precautions;Goal Met  (pt verbalized understanding and  denies concerns re: completion of stairs, also states spouse would be able to lift her into home if needed)   Interventions   Interventions Quick Adds Gait Training;Therapeutic Activity   Therapeutic Activity   Therapeutic Activities: dynamic activities to improve functional performance Minutes (15026) 12   Symptoms Noted During/After Treatment Increased pain   Treatment Detail/Skilled Intervention edu on NWB RLE and safe mobility within these precautions, discussed equipment needs including walker and crutches as pt stating she feels more comfortable with walker currently, pt already has crutches at home and pt's mother is able to get walker. edu on importance of elevation for edema management, pt understanding.   Gait Training   Gait Training Minutes (98661) 12   Symptoms Noted During/After Treatment (Gait Training) increased pain   Treatment Detail/Skilled Intervention edu on gait mechanics wtih good carryover, see above for recommendations for crutches and walker for mobility, pt plans to use walker initially. verbally reviewed completing stairs including hopping and scooting up on bottom, pt in agreement and based on ambulation assessment, the pt will not have any issues so not completed. encouraged pt to remain on main level to reduce jostling of RLE fracture if able, plans to stay on main level.   Distance in Feet 100   [place] Level (Gait Training) stand-by assist   Physical Assistance Level (Gait Training) supervision   Weight Bearing (Gait Training) nonweight-bearing   Assistive Device (Gait Training) standard walker   Gait Analysis Deviations decreased dudley   Impairments (Gait Analysis/Training) pain   PT Discharge Planning   PT Plan d/c PT, goals met   PT Discharge Recommendation (DC Rec) home with assist   PT Rationale for DC Rec pt mobilizing well and is able to maintain NWB RLE with walker, she is safe to reutrn home from a PT perspective as long as pain continues to be well managed   PT Brief  overview of current status amb 100 feet with 2WW and Dariana/mod I   PT Equipment Needed at Discharge crutches, axillary;walker, standard  (has crutches, mother is getting walker)   Total Session Time   Timed Code Treatment Minutes 24   Total Session Time (sum of timed and untimed services) 34   [facility]  OUTPATIENT PHYSICAL THERAPY EVALUATION  PLAN OF TREATMENT FOR OUTPATIENT REHABILITATION  (COMPLETE FOR INITIAL CLAIMS ONLY)  Patient's Last Name, First Name, M.I.  YOB: 1995  Otf Beck                        Provider's Name  [facility] Medical Record No.  [id number]                             Onset Date:  01/10/24   Start of Care Date:  01/11/24   Type:     _X_PT   ___OT   ___SLP Medical Diagnosis:  R distal tibia fracture              PT Diagnosis:  impaired functional mobility Visits from SOC:  1     See note for plan of treatment, functional goals and certification details    I CERTIFY THE NEED FOR THESE SERVICES FURNISHED UNDER        THIS PLAN OF TREATMENT AND WHILE UNDER MY CARE     (Physician co-signature of this document indicates review and certification of the therapy plan).

## 2024-01-11 NOTE — PROGRESS NOTES
[facility] Medicine Progress Note  Date of Service: 01/11/2024    Assessment & Plan   Otf Beck is a 28 year old female who presented on 1/10/2024 for scheduled Procedure(s):  Right tibial nail, open reduction internal fixation distal fibula, open reduction internal fixation posterior malleolus by Austyn Carolina MD and is being followed by the hospital medicine service for co-management of acute and/or chronic perioperative medical problems.    Right closed spiral tibial shaft fracture / distal fibula fracture, posterior malleolus fracture   S/p Procedure(s):  Right tibial nail, open reduction internal fixation distal fibula, open reduction internal fixation posterior malleolus on 1/10/24  1 Day Post-Op    - pain control, wound cares, physical therapy, occupational therapy and DVT prophylaxis per orthopedic surgery service    Mild intermittent asthma  Stable respiratory status. Managed prior to admission with prn albuterol.  - Albuterol nebs available      DVT Prophylaxis: as per orthopedic surgery service - Pneumatic Compression Devices and aspirin 325 mg daily x 30 days  Code Status: Full Code    Lines: Peripheral   Ace catheter: No    Discussion: Medically, the patient appears to be making appropriate post-op discharge.    Disposition: Anticipate discharge today to home. No barriers to discharge from internal medicine standpoint.    Attestation:  I have reviewed today's vital signs, notes, medications, labs and imaging.    Catrachita Mcclain PA-C  [facility] Hospitalist [facility]         Interval History   Patient feeling well after surgery. Pain rated 6-7/10, has constant pain but is tolerable. Denies numbness or tingling.     Tolerating oral intake, denies abdominal pain, nausea, vomiting. No bowel movement since surgery, passing flatus. Urinating without difficulty, no feelings of incomplete bladder emptying.    Denies chest pain, palpitations, cough,  wheeze, shortness of breath, dizziness, or other complaints.    Physical Exam   Temp:  [98.1  F (36.7  C)-99.1  F (37.3  C)] 98.4  F (36.9  C)  Pulse:  [51-83] 67  Resp:  [7-16] 14  BP: ()/(53-75) 108/57  SpO2:  [94 %-100 %] 96 %    Weights:   Vitals:    01/10/24 1020   Weight: 65.8 kg (145 lb)    Body mass index is 24.7 kg/m .    General appearance: Awake, alert, and in no apparent distress. Pleasant and conversational, speaking in full sentences.  CV: Regular rhythm & rate, no murmurs. No edema. Peripheral pulses intact.  Respiratory: Moving air well bilaterally, no wheezing, crackles, or rhonchi.  GI: Non-distended, soft, nontender to palpation. No rebound or guarding. Normoactive bowel sounds.  Skin: Warm, dry, no rashes or ecchymoses. No mottling of skin. Right lower extremity in a splint and wrapped in an ACE bandage, clean and dry.  Musculoskeletal / extremities: Moves all extremities equally, no obvious abnormalities. Distal CMS intact.  Neurologic: No focal deficits.      Data   Recent Labs   Lab 01/10/24  1026   WBC 6.2   HGB 12.2   MCV 88         POTASSIUM 4.3   CHLORIDE 107   CO2 22   BUN 5.1*   CR 0.76   ANIONGAP 10   HAILEY 9.0   GLC 98       Recent Labs   Lab 01/10/24  1026   GLC 98        Unresulted Labs Ordered in the Past 30 Days of this Admission       No orders found for last 31 day(s).             Imaging  Recent Results (from the past 24 hour(s))   XR Surgery MONSERRAT L/T 5 Min Fluoro    Narrative    This exam was marked as non-reportable because it will not be read by a   radiologist or a [facility] non-radiologist provider.              I reviewed all new labs and imaging results over the last 24 hours. I personally reviewed no images or EKG's today.    Medications    lactated ringers 75 mL/hr at 01/10/24 2147      acetaminophen  975 mg Oral Q8H    aspirin  325 mg Oral Daily    ketorolac  15 mg Intravenous Q6H    polyethylene glycol  17 g Oral Daily    senna-docusate  1 tablet Oral  BID    sodium chloride (PF)  3 mL Intracatheter Q8H       Catrachita Mcclain PA-C  [facility]ist [facility]

## 2024-01-11 NOTE — PLAN OF CARE
Physical Therapy Discharge Summary    Reason for therapy discharge:    All goals and outcomes met, no further needs identified.    Progress towards therapy goal(s). See goals on Care Plan in Epic electronic health record for goal details.  Goals met    Therapy recommendation(s):    No further therapy is recommended. Pt able to mobilize independently while maintaining NWB RLE. She is safe to return home today from a PT perspective as long as pain continues to be well managed.

## 2024-01-11 NOTE — PROGRESS NOTES
Patient vital signs are at baseline: Yes  Patient able to ambulate as they were prior to admission or with assist devices provided by therapies during their stay:  No,  Reason:  D/T surgery and non-weight bearing status. However, patient is ambulating with a walker and assist x1.  Patient MUST void prior to discharge:  Yes, patient has voided several times.   Patient able to tolerate oral intake:  Yes  Pain has adequate pain control using Oral analgesics:  Yes  Does patient have an identified :  Yes  Has goal D/C date and time been discussed with patient:  Yes

## 2024-01-11 NOTE — PROGRESS NOTES
Patient vital signs are at baseline: Yes  Patient able to ambulate as they were prior to admission or with assist devices provided by therapies during their stay:  Yes  Patient MUST void prior to discharge:  Yes  Patient able to tolerate oral intake:  Yes  Pain has adequate pain control using Oral analgesics:  Yes  Does patient have an identified :  Yes  Has goal D/C date and time been discussed with patient:  Yes      Patient was able to walk with standby assistance and tolerated this well. She was able to void on the toilet. Tolerating oral intake without troubles.

## 2024-01-11 NOTE — PLAN OF CARE
Time: Assumed care of patient at 1900 on 1.10.24    Reason, date of admission: Admitted on 1.10.24 for surgical repair of right distal tibia        Activity: Assist x 1 to SBA with walker (has declined to use the gait belt)    Neuro: Alert and oriented    GI/:  WNL       Last BM: 1.9.24    Lines/Drains: PIV in left wrist      Fluids: SL     Goal Outcome Evaluation:      Plan of Care Reviewed With: patient    Overall Patient Progress: improvingOverall Patient Progress: improving    Outcome Evaluation: Patient tolerating regular diet, PO pain medications, is voiding well, on RA Plan to discharge on 1.11.24.

## 2024-01-11 NOTE — PROGRESS NOTES
"[facility] Orthopaedics Progress Note      Post-operative Day: 1 Day Post-Op    Procedure(s):  Right tibial nail, open reduction internal fixation distal fibula, open reduction internal fixation posterior malleolus  Subjective:    Pt reports ongoing pain in the right leg; more in the knee than the ankle. 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 She plans on going home later today.     Chest pain, SOB:  No  Nausea, vomiting:  Non  Lightheadedness, dizziness:  no  Neuro:  Patient denies new onset numbness or paresthesias      Objective:  Blood pressure 107/71, pulse 71, temperature 98.9  F (37.2  C), temperature source Oral, resp. rate 16, height 1.632 m (5' 4.25\"), weight 65.8 kg (145 lb), last menstrual period 12/31/2023, SpO2 98%.    Patient Vitals for the past 24 hrs:   BP Temp Temp src Pulse Resp SpO2 Height Weight   01/11/24 0908 107/71 98.9  F (37.2  C) Oral 71 16 98 % -- --   01/11/24 0408 108/57 98.4  F (36.9  C) Oral 67 14 96 % -- --   01/10/24 2302 99/56 98.1  F (36.7  C) Oral 72 12 98 % -- --   01/10/24 1911 -- -- -- -- 14 -- -- --   01/10/24 1854 -- -- -- -- 13 -- -- --   01/10/24 1817 -- -- -- -- 13 -- -- --   01/10/24 1800 106/58 -- -- 83 -- -- -- --   01/10/24 1730 109/75 -- -- 78 -- -- -- --   01/10/24 1700 119/59 -- -- 58 -- -- -- --   01/10/24 1631 110/60 98.4  F (36.9  C) Oral 64 -- -- -- --   01/10/24 1600 110/66 -- -- 63 -- 99 % -- --   01/10/24 1545 -- -- -- 57 10 97 % -- --   01/10/24 1537 93/63 -- -- 51 10 96 % -- --   01/10/24 1530 -- -- -- 54 (!) 7 94 % -- --   01/10/24 1521 -- 98.5  F (36.9  C) Temporal 59 -- 96 % -- --   01/10/24 1515 106/53 -- -- 67 14 96 % -- --   01/10/24 1500 111/63 -- -- 62 16 99 % -- --   01/10/24 1448 106/63 98.9  F (37.2  C) Temporal 59 16 99 % -- --   01/10/24 1121 -- -- -- 62 16 99 % -- --   01/10/24 1020 123/70 99.1  F (37.3  C) Oral 67 12 100 % 1.632 m (5' 4.25\") 65.8 " kg (145 lb)       Wt Readings from Last 4 Encounters:   01/10/24 65.8 kg (145 lb)   10/27/22 66.2 kg (145 lb 14.4 oz)   07/28/21 64.9 kg (143 lb)   06/14/21 64.2 kg (141 lb 7 oz)       Gen: A&O x 3. NAD. Up to the recliner, right ankle elevated.   Wound status: Covered, post op dressings and splint intact  Circulation, motion and sensation: Flexes and extends the right toes, distal lower extremity sensation is intact and equal bilaterally. Foot and toes are warm and well perfused.    Swelling: Moderate    Pertinent Labs   Lab Results: personally reviewed.     Recent Labs   Lab Test 01/10/24  1026 09/07/21  1037 06/14/21  1439 12/29/20  1235 05/20/20  0856   INR  --   --   --  0.97  --    WBC 6.2  --  5.3 5.4 5.0   HGB 12.2  --  12.0 14.0 13.5   HCT 36.6  --  34.9* 42.1 38.6   MCV 88  --  87 87 85     --  169 201 150    141  --   --  139       Plan:   Continue current cares and rehabilitation.  Anticoagulation protocol:  mg daily  x 30  days  Pain medications:  oxycodone, toradol, and tylenol. Hydroxyzine added for additional pain management.   Weight bearing status:  NWB  Disposition:  Plan for discharge to home when medically stable and pain is controlled, cleared by therapy. Later today.             Report completed by:  Dimitri Pineda PA-C  Date: 1/11/2024  Time: 9:23 AM

## 2024-01-11 NOTE — DISCHARGE SUMMARY
[facility] Orth[facility] Discharge Summary                                  [facility]     WINSOME JACOBS [id number]   Age: 28 year old  PCP: Emerald Newman, [phone] 1995     Date of Admission:  1/10/2024  Date of Discharge::  1/11/2024  Discharge Physician:  Dimitri Pineda PA-C    Code status:  Full Code    Admission Information:  Admission Diagnosis:  Tibia/fibula fracture [S82.209A, S82.409A]  Closed fracture of right distal tibia [S82.301A]    Post-Operative Day: 1 Day Post-Op     Reason for admission:  The patient was admitted for the following:Procedure(s) (LRB):  Right tibial nail, open reduction internal fixation distal fibula, open reduction internal fixation posterior malleolus (Right)    Principal Problem:    Closed fracture of right distal tibia      Allergies:  Amoxicillin, Sulfa (sulfonamide antibiotics) [sulfa antibiotics], and Sulfamethoxazole-trimethoprim [sulfamethoxazole-trimethoprim]    Following the procedure noted above the patient was transferred to the post-op floor and started on:    Therapy:  physical therapy  Anticoagulation Plan:  mg daily  for 30 days  Pain Management: oxycodone, toradol, tylenol, and vistaril  Weight bearing status: Non-weight bearing     The patient was followed and co-managed by the hospitalist service during the inpatient treatment course  Complications:  None  Consultations:  None     Pertinent Labs   Lab Results: personally reviewed.     Recent Labs   Lab Test 01/10/24  1026 09/07/21  1037 06/14/21  1439 12/29/20  1235 05/20/20  0856   INR  --   --   --  0.97  --    WBC 6.2  --  5.3 5.4 5.0   HGB 12.2  --  12.0 14.0 13.5   HCT 36.6  --  34.9* 42.1 38.6   MCV 88  --  87 87 85     --  169 201 150    141  --   --  139          Discharge Information:  Condition at discharge: Stable  Discharge destination:  Discharged to home     Medications at discharge:  Discharge Medication List as of 1/11/2024 11:25 AM        START  taking these medications    Details   aspirin (ASA) 325 MG EC tablet Take 1 tablet (325 mg) by mouth daily, Disp-30 tablet, R-0, E-Prescribe      hydrOXYzine HCl (ATARAX) 25 MG tablet Take 1 tablet (25 mg) by mouth every 6 hours as needed for other (adjuvant pain), Disp-30 tablet, R-0, E-Prescribe      ibuprofen (ADVIL/MOTRIN) 600 MG tablet Take 1 tablet (600 mg) by mouth every 8 hours as needed for mild pain, Disp-30 tablet, R-0, E-Prescribe      oxyCODONE (ROXICODONE) 5 MG tablet Take 1-2 tablets (5-10 mg) by mouth every 4 hours as needed for moderate to severe pain, Disp-20 tablet, R-0, E-Prescribe      senna-docusate (SENOKOT-S/PERICOLACE) 8.6-50 MG tablet Take 1-2 tablets by mouth 2 times daily Take while on oral narcotics to prevent or treat constipation., Disp-30 tablet, R-0, E-PrescribeWhile taking narcotics           CONTINUE these medications which have CHANGED    Details   acetaminophen (TYLENOL) 325 MG tablet Take 2 tablets (650 mg) by mouth every 4 hours as needed for other (mild pain), Disp-100 tablet, R-0, E-Prescribe           CONTINUE these medications which have NOT CHANGED    Details   albuterol (PROAIR HFA/PROVENTIL HFA/VENTOLIN HFA) 108 (90 Base) MCG/ACT inhaler Inhale 2 puffs into the lungs every 4 hours as needed for shortness of breath / dyspnea or wheezing, Disp-18 g, R-1, E-PrescribePharmacy may dispense brand covered by insurance (Proair, or proventil or ventolin or generic albuterol inhaler)      ergocalciferol (ERGOCALCIFEROL) 1,250 mcg (50,000 unit) capsule [ERGOCALCIFEROL (ERGOCALCIFEROL) 1,250 MCG (50,000 UNIT) CAPSULE] Take 1 capsule (50,000 Units total) by mouth every 7 days., Disp-12 capsule, R-0, Normal      folic acid (FOLVITE) 1 MG tablet [FOLIC ACID (FOLVITE) 1 MG TABLET] Take 1 mg by mouth daily., Historical      MAGNESIUM PO Take 1 tablet by mouth daily, Historical           STOP taking these medications       aspirin 81 mg chewable tablet Comments:   Reason for Stopping:                           Follow-Up Care:  Patient should be seen in the office in 14 days by the Orthopedic Surgeon/Physician Assistant.  Call [phone] for appointment or questions.    Dimitri Pineda PA-C

## 2024-01-11 NOTE — PROGRESS NOTES
[facility] DISCHARGE NOTE    Patient discharged to home at 12:03 PM via wheel chair. Accompanied by spouse and staff. Discharge instructions reviewed with patient and spouse, opportunity offered to ask questions. Prescriptions sent to patients preferred pharmacy. All belongings sent with patient.    Manuel Joseph RN

## 2024-01-25 ENCOUNTER — TRANSFERRED RECORDS (OUTPATIENT)
Dept: HEALTH INFORMATION MANAGEMENT | Facility: CLINIC | Age: 29
End: 2024-01-25
Payer: COMMERCIAL

## 2024-02-16 ENCOUNTER — TRANSFERRED RECORDS (OUTPATIENT)
Dept: HEALTH INFORMATION MANAGEMENT | Facility: CLINIC | Age: 29
End: 2024-02-16
Payer: COMMERCIAL

## 2024-03-21 ENCOUNTER — TRANSFERRED RECORDS (OUTPATIENT)
Dept: HEALTH INFORMATION MANAGEMENT | Facility: CLINIC | Age: 29
End: 2024-03-21
Payer: COMMERCIAL

## 2024-09-19 ENCOUNTER — OFFICE VISIT (OUTPATIENT)
Dept: FAMILY MEDICINE | Facility: CLINIC | Age: 29
End: 2024-09-19
Payer: COMMERCIAL

## 2024-09-19 VITALS
RESPIRATION RATE: 15 BRPM | HEIGHT: 65 IN | BODY MASS INDEX: 24.99 KG/M2 | DIASTOLIC BLOOD PRESSURE: 65 MMHG | TEMPERATURE: 98 F | OXYGEN SATURATION: 99 % | WEIGHT: 150 LBS | HEART RATE: 64 BPM | SYSTOLIC BLOOD PRESSURE: 99 MMHG

## 2024-09-19 DIAGNOSIS — Z13.220 LIPID SCREENING: ICD-10-CM

## 2024-09-19 DIAGNOSIS — Z79.899 MEDICATION MANAGEMENT: ICD-10-CM

## 2024-09-19 DIAGNOSIS — J45.20 MILD INTERMITTENT ASTHMA WITHOUT COMPLICATION: ICD-10-CM

## 2024-09-19 DIAGNOSIS — Z00.00 ENCOUNTER FOR ROUTINE HISTORY AND PHYSICAL EXAM IN FEMALE: Primary | ICD-10-CM

## 2024-09-19 DIAGNOSIS — Z12.83 SKIN EXAM, SCREENING FOR CANCER: ICD-10-CM

## 2024-09-19 LAB
ANION GAP SERPL CALCULATED.3IONS-SCNC: 9 MMOL/L (ref 7–15)
BUN SERPL-MCNC: 14.6 MG/DL (ref 6–20)
CALCIUM SERPL-MCNC: 8.9 MG/DL (ref 8.8–10.4)
CHLORIDE SERPL-SCNC: 107 MMOL/L (ref 98–107)
CHOLEST SERPL-MCNC: 214 MG/DL
CREAT SERPL-MCNC: 0.99 MG/DL (ref 0.51–0.95)
EGFRCR SERPLBLD CKD-EPI 2021: 79 ML/MIN/1.73M2
FASTING STATUS PATIENT QL REPORTED: ABNORMAL
FASTING STATUS PATIENT QL REPORTED: ABNORMAL
GLUCOSE SERPL-MCNC: 64 MG/DL (ref 70–99)
HCO3 SERPL-SCNC: 27 MMOL/L (ref 22–29)
HDLC SERPL-MCNC: 141 MG/DL
HGB BLD-MCNC: 12.3 G/DL (ref 11.7–15.7)
LDLC SERPL CALC-MCNC: 67 MG/DL
NONHDLC SERPL-MCNC: 73 MG/DL
POTASSIUM SERPL-SCNC: 4.8 MMOL/L (ref 3.4–5.3)
SODIUM SERPL-SCNC: 143 MMOL/L (ref 135–145)
TRIGL SERPL-MCNC: 30 MG/DL

## 2024-09-19 PROCEDURE — 80061 LIPID PANEL: CPT | Performed by: NURSE PRACTITIONER

## 2024-09-19 PROCEDURE — 99395 PREV VISIT EST AGE 18-39: CPT | Performed by: NURSE PRACTITIONER

## 2024-09-19 PROCEDURE — 80048 BASIC METABOLIC PNL TOTAL CA: CPT | Performed by: NURSE PRACTITIONER

## 2024-09-19 PROCEDURE — 85018 HEMOGLOBIN: CPT | Performed by: NURSE PRACTITIONER

## 2024-09-19 PROCEDURE — 99213 OFFICE O/P EST LOW 20 MIN: CPT | Mod: 25 | Performed by: NURSE PRACTITIONER

## 2024-09-19 PROCEDURE — 36415 COLL VENOUS BLD VENIPUNCTURE: CPT | Performed by: NURSE PRACTITIONER

## 2024-09-19 RX ORDER — ALBUTEROL SULFATE 90 UG/1
2 AEROSOL, METERED RESPIRATORY (INHALATION) EVERY 4 HOURS PRN
Qty: 18 G | Refills: 1 | Status: SHIPPED | OUTPATIENT
Start: 2024-09-19

## 2024-09-19 ASSESSMENT — PAIN SCALES - GENERAL: PAINLEVEL: NO PAIN (0)

## 2024-09-19 ASSESSMENT — ASTHMA QUESTIONNAIRES
ACT_TOTALSCORE: 25
QUESTION_4 LAST FOUR WEEKS HOW OFTEN HAVE YOU USED YOUR RESCUE INHALER OR NEBULIZER MEDICATION (SUCH AS ALBUTEROL): NOT AT ALL
QUESTION_5 LAST FOUR WEEKS HOW WOULD YOU RATE YOUR ASTHMA CONTROL: COMPLETELY CONTROLLED
ACT_TOTALSCORE: 25
QUESTION_3 LAST FOUR WEEKS HOW OFTEN DID YOUR ASTHMA SYMPTOMS (WHEEZING, COUGHING, SHORTNESS OF BREATH, CHEST TIGHTNESS OR PAIN) WAKE YOU UP AT NIGHT OR EARLIER THAN USUAL IN THE MORNING: NOT AT ALL
QUESTION_2 LAST FOUR WEEKS HOW OFTEN HAVE YOU HAD SHORTNESS OF BREATH: NOT AT ALL
QUESTION_1 LAST FOUR WEEKS HOW MUCH OF THE TIME DID YOUR ASTHMA KEEP YOU FROM GETTING AS MUCH DONE AT WORK, SCHOOL OR AT HOME: NONE OF THE TIME

## 2024-09-19 NOTE — PROGRESS NOTES
Assessment and Plan:    Encounter for routine history and physical exam in female  Recommend consuming a healthy diet and exercising.  She declines HIV and hepatitis C screening.  She declines influenza and COVID vaccines.  - Hemoglobin  - Hemoglobin    Lipid screening  - Lipid panel reflex to direct LDL Non-fasting  - Lipid panel reflex to direct LDL Non-fasting    Mild intermittent asthma without complication  This is controlled.  She continues albuterol as needed.  - albuterol (PROAIR HFA/PROVENTIL HFA/VENTOLIN HFA) 108 (90 Base) MCG/ACT inhaler  Dispense: 18 g; Refill: 1    Skin exam, screening for cancer  Will refer to dermatology for thorough skin exam.  I encouraged sunscreen use.  - Adult Dermatology  Referral    Medication management  - Basic metabolic panel  (Ca, Cl, CO2, Creat, Gluc, K, Na, BUN)  - Basic metabolic panel  (Ca, Cl, CO2, Creat, Gluc, K, Na, BUN)      Subjective:     Otf is a 29 year old female presenting to the clinic for a female physical.     LMP: 9/7/24 regular   Hx of abnormal pap smear: none   Last pap smear: 10/13/22   Perform self-breast exams: yes   Vaginal discharge or irritation: none   Sexually active: yes,  for four years   Contraception: none   Concerns for STDs: none   Previous pregnancies:none     Patient has a history of intermittent asthma which flares with humidity.  She uses albuterol once per month.  Patient requests referral to dermatology for thorough skin exam.    Review of systems:  I performed a 10 point review of systems.  All pertinent positives and negatives are noted in the HPI. All others are negative.     Allergies   Allergen Reactions    Amoxicillin Rash    Sulfa (Sulfonamide Antibiotics) [Sulfa Antibiotics] Hives and Rash    Sulfamethoxazole-Trimethoprim [Sulfamethoxazole-Trimethoprim] Rash       Current Outpatient Medications   Medication Sig Dispense Refill    acetaminophen (TYLENOL) 325 MG tablet Take 2 tablets (650 mg) by mouth every 4  hours as needed for other (mild pain) 100 tablet 0    albuterol (PROAIR HFA/PROVENTIL HFA/VENTOLIN HFA) 108 (90 Base) MCG/ACT inhaler Inhale 2 puffs into the lungs every 4 hours as needed for shortness of breath / dyspnea or wheezing 18 g 1    ergocalciferol (ERGOCALCIFEROL) 1,250 mcg (50,000 unit) capsule [ERGOCALCIFEROL (ERGOCALCIFEROL) 1,250 MCG (50,000 UNIT) CAPSULE] Take 1 capsule (50,000 Units total) by mouth every 7 days. 12 capsule 0    ibuprofen (ADVIL/MOTRIN) 600 MG tablet Take 1 tablet (600 mg) by mouth every 8 hours as needed for mild pain 30 tablet 0    aspirin (ASA) 325 MG EC tablet Take 1 tablet (325 mg) by mouth daily (Patient not taking: Reported on 9/19/2024) 30 tablet 0    folic acid (FOLVITE) 1 MG tablet [FOLIC ACID (FOLVITE) 1 MG TABLET] Take 1 mg by mouth daily. (Patient not taking: Reported on 9/19/2024)      hydrOXYzine HCl (ATARAX) 25 MG tablet Take 1 tablet (25 mg) by mouth every 6 hours as needed for other (adjuvant pain) (Patient not taking: Reported on 9/19/2024) 30 tablet 0    MAGNESIUM PO Take 1 tablet by mouth daily (Patient not taking: Reported on 9/19/2024)      oxyCODONE (ROXICODONE) 5 MG tablet Take 1-2 tablets (5-10 mg) by mouth every 4 hours as needed for moderate to severe pain (Patient not taking: Reported on 9/19/2024) 20 tablet 0    senna-docusate (SENOKOT-S/PERICOLACE) 8.6-50 MG tablet Take 1-2 tablets by mouth 2 times daily Take while on oral narcotics to prevent or treat constipation. (Patient not taking: Reported on 9/19/2024) 30 tablet 0     No current facility-administered medications for this visit.       Social History     Socioeconomic History    Marital status:      Spouse name: Not on file    Number of children: 0    Years of education: Not on file    Highest education level: Not on file   Occupational History    Not on file   Tobacco Use    Smoking status: Never     Passive exposure: Never    Smokeless tobacco: Never   Vaping Use    Vaping status: Never  Used   Substance and Sexual Activity    Alcohol use: Yes     Alcohol/week: 2.0 standard drinks of alcohol    Drug use: No    Sexual activity: Never   Other Topics Concern    Not on file   Social History Narrative    Not on file     Social Determinants of Health     Financial Resource Strain: Low Risk  (9/19/2024)    Financial Resource Strain     Within the past 12 months, have you or your family members you live with been unable to get utilities (heat, electricity) when it was really needed?: No   Food Insecurity: Low Risk  (9/19/2024)    Food Insecurity     Within the past 12 months, did you worry that your food would run out before you got money to buy more?: No     Within the past 12 months, did the food you bought just not last and you didn t have money to get more?: No   Transportation Needs: Low Risk  (9/19/2024)    Transportation Needs     Within the past 12 months, has lack of transportation kept you from medical appointments, getting your medicines, non-medical meetings or appointments, work, or from getting things that you need?: No   Physical Activity: Unknown (9/19/2024)    Exercise Vital Sign     Days of Exercise per Week: 7 days     Minutes of Exercise per Session: Not on file   Stress: No Stress Concern Present (9/19/2024)    [employer] - Occupational Stress Questionnaire     Feeling of Stress : Not at all   Social Connections: Unknown (9/19/2024)    Social Connection and Isolation Panel [NHANES]     Frequency of Communication with Friends and Family: Not on file     Frequency of Social Gatherings with Friends and Family: Once a week     Attends Anabaptism Services: Not on file     Active Member of Clubs or Organizations: Not on file     Attends Club or Organization Meetings: Not on file     Marital Status: Not on file   Interpersonal Safety: Not on file   Housing Stability: Low Risk  (9/19/2024)    Housing Stability     Do you have housing? : Yes     Are you worried about  "losing your housing?: No       Past Medical History:   Diagnosis Date    Asthma     Clotting disorder (H24)     Peptic ulceration        Family History   Problem Relation Age of Onset    Cancer Paternal Grandfather 74.00        mesothelioma    No Known Problems Mother     No Known Problems Father        Past Surgical History:   Procedure Laterality Date    OPEN REDUCTION INTERNAL FIXATION RODDING INTRAMEDULLARY TIBIA Right 1/10/2024    Procedure: Right tibial nail, open reduction internal fixation distal fibula, open reduction internal fixation posterior malleolus;  Surgeon: Austyn Carolina MD;  Location: [facility] OR       Objective:     BP 99/65   Pulse 64   Temp 98  F (36.7  C)   Resp 15   Ht 1.638 m (5' 4.5\")   Wt 68 kg (150 lb)   LMP 09/07/2024   SpO2 99%   Breastfeeding No   BMI 25.35 kg/m      Patient is alert, no obvious distress.   Skin: Warm, dry.  No rashes or lesions. Skin turgor rapid return.   HEENT:  Eyes normal.  Ears normal.  Nose patent, mucosa pink.  Oropharynx mucosa pink, no lesions or tonsil enlargement.   Neck:  Supple, without lymphadenopathy, bruits, JVD. Thyroid normal texture and size.    Lungs:  Clear to auscultation.  No wheezing, rales noted.  Respirations even and unlabored.   Heart:  Regular rate and rhythm.  No murmurs.   Breasts:  deferred per patient   Abdomen: Soft, nontender.  No organomegaly.  Bowel sounds normoactive.  No guarding or masses noted.   :  deferred  Musculoskeletal:  Full ROM of extremities.  Muscle strength equal +5/5.   Neurological:  Cranial nerves 2-12 intact.              "

## 2025-08-20 ENCOUNTER — PATIENT OUTREACH (OUTPATIENT)
Dept: CARE COORDINATION | Facility: CLINIC | Age: 30
End: 2025-08-20
Payer: COMMERCIAL

## 2025-09-03 ENCOUNTER — PATIENT OUTREACH (OUTPATIENT)
Dept: CARE COORDINATION | Facility: CLINIC | Age: 30
End: 2025-09-03
Payer: COMMERCIAL

## (undated) DEVICE — SYR BULB IRRIG DOVER 60 ML LATEX FREE 67000

## (undated) DEVICE — Device

## (undated) DEVICE — SOL NACL 0.9% IRRIG 1000ML BOTTLE 07138-09

## (undated) DEVICE — DRSG GAUZE 4X4" TRAY

## (undated) DEVICE — GUIDE WIRE 1.25X150MM NON THRD  900.721

## (undated) DEVICE — DRILL BIT QUICK COUPLING 2.5X110MM GOLD 310.25

## (undated) DEVICE — DRAPE C-ARM 60X42" 1013

## (undated) DEVICE — ROD SYN REAMER BALL TIP 2.5X950MM  351.706S

## (undated) DEVICE — SOL WATER IRRIG 1000ML BOTTLE 07139-09

## (undated) DEVICE — GLOVE BIOGEL PI MICRO SZ 8.0 48580

## (undated) DEVICE — GOWN IMPERVIOUS SPECIALTY XLG/XLONG 32474

## (undated) DEVICE — DRAPE SHEET REV FOLD 3/4 9349

## (undated) DEVICE — SUCTION MANIFOLD NEPTUNE 2 SYS 4 PORT 0702-020-000

## (undated) DEVICE — SU ETHILON 4-0 P-3 18" BLACK 699G

## (undated) DEVICE — CAST PADDING 6IN COTTON WEBRIL STERILE 2554

## (undated) DEVICE — SU VICRYL 0 CT-1 36" J946H

## (undated) DEVICE — PAD ABD 10X8IN DERMACEA ABS NWVN 4 SL EDG SFT LF STRL 7198D

## (undated) DEVICE — SU ETHILON 3-0 PS-2 18" 1669H

## (undated) DEVICE — DRILL BIT QUICK COUPLING 3 FLUTE 4.2MMX145MM NDL  POINT

## (undated) DEVICE — LOCKING SCREW FOR IM NAIL Ø 5MM/ 48MM/ XL25/ STERILE
Type: IMPLANTABLE DEVICE | Site: LEG | Status: NON-FUNCTIONAL
Removed: 2024-01-10

## (undated) DEVICE — BIT DRL 4.2MM XLNG CLBRT

## (undated) DEVICE — DRSG ADAPTIC 3X8" 6113

## (undated) DEVICE — DRILL BIT QUICK COUPLING CANN 2.7MM 310.67

## (undated) DEVICE — GLOVE BIOGEL PI MICRO INDICATOR UNDERGLOVE SZ 8.5 48985

## (undated) DEVICE — DRAPE C-ARMOR 5 SIDED 5523

## (undated) DEVICE — DRILL BIT QUICK COUPLING 2.0X125MM  310.21

## (undated) DEVICE — SU VICRYL 2-0 CT-1 36" UND J945H

## (undated) DEVICE — WIRE GUIDE 3.2X400MM  357.399

## (undated) DEVICE — PREP CHLORAPREP 26ML TINTED ORANGE  260815

## (undated) DEVICE — DRILL BIT SYN QUICK COUPLING 2.7X125MM 315.28

## (undated) RX ORDER — KETOROLAC TROMETHAMINE 30 MG/ML
INJECTION, SOLUTION INTRAMUSCULAR; INTRAVENOUS
Status: DISPENSED
Start: 2024-01-10

## (undated) RX ORDER — MAGNESIUM SULFATE HEPTAHYDRATE 40 MG/ML
INJECTION, SOLUTION INTRAVENOUS
Status: DISPENSED
Start: 2024-01-10

## (undated) RX ORDER — FENTANYL CITRATE-0.9 % NACL/PF 10 MCG/ML
PLASTIC BAG, INJECTION (ML) INTRAVENOUS
Status: DISPENSED
Start: 2024-01-10

## (undated) RX ORDER — PROPOFOL 10 MG/ML
INJECTION, EMULSION INTRAVENOUS
Status: DISPENSED
Start: 2024-01-10

## (undated) RX ORDER — FENTANYL CITRATE 50 UG/ML
INJECTION, SOLUTION INTRAMUSCULAR; INTRAVENOUS
Status: DISPENSED
Start: 2024-01-10

## (undated) RX ORDER — GABAPENTIN 300 MG/1
CAPSULE ORAL
Status: DISPENSED
Start: 2024-01-10

## (undated) RX ORDER — DEXAMETHASONE SODIUM PHOSPHATE 10 MG/ML
INJECTION, SOLUTION INTRAMUSCULAR; INTRAVENOUS
Status: DISPENSED
Start: 2024-01-10

## (undated) RX ORDER — GLYCOPYRROLATE 0.2 MG/ML
INJECTION, SOLUTION INTRAMUSCULAR; INTRAVENOUS
Status: DISPENSED
Start: 2024-01-10

## (undated) RX ORDER — ONDANSETRON 2 MG/ML
INJECTION INTRAMUSCULAR; INTRAVENOUS
Status: DISPENSED
Start: 2024-01-10

## (undated) RX ORDER — CEFAZOLIN SODIUM/WATER 2 G/20 ML
SYRINGE (ML) INTRAVENOUS
Status: DISPENSED
Start: 2024-01-10